# Patient Record
Sex: FEMALE | Race: WHITE | NOT HISPANIC OR LATINO | Employment: OTHER | URBAN - METROPOLITAN AREA
[De-identification: names, ages, dates, MRNs, and addresses within clinical notes are randomized per-mention and may not be internally consistent; named-entity substitution may affect disease eponyms.]

---

## 2024-07-29 ENCOUNTER — OFFICE VISIT (OUTPATIENT)
Dept: MEDICAL GROUP | Facility: MEDICAL CENTER | Age: 79
End: 2024-07-29
Payer: MEDICARE

## 2024-07-29 VITALS
HEART RATE: 50 BPM | HEIGHT: 64 IN | BODY MASS INDEX: 28.51 KG/M2 | TEMPERATURE: 97.3 F | SYSTOLIC BLOOD PRESSURE: 134 MMHG | DIASTOLIC BLOOD PRESSURE: 80 MMHG | WEIGHT: 167 LBS | OXYGEN SATURATION: 93 %

## 2024-07-29 DIAGNOSIS — R73.09 ELEVATED GLUCOSE LEVEL: ICD-10-CM

## 2024-07-29 DIAGNOSIS — Z00.00 ENCOUNTER FOR PREVENTATIVE ADULT HEALTH CARE EXAMINATION: Primary | ICD-10-CM

## 2024-07-29 DIAGNOSIS — I10 PRIMARY HYPERTENSION: ICD-10-CM

## 2024-07-29 DIAGNOSIS — G04.90 ENCEPHALITIS: ICD-10-CM

## 2024-07-29 DIAGNOSIS — E04.1 THYROID NODULE: ICD-10-CM

## 2024-07-29 DIAGNOSIS — E78.5 HYPERLIPIDEMIA, UNSPECIFIED HYPERLIPIDEMIA TYPE: ICD-10-CM

## 2024-07-29 DIAGNOSIS — R56.9 SEIZURE (HCC): ICD-10-CM

## 2024-07-29 DIAGNOSIS — M35.3 POLYMYALGIA RHEUMATICA (HCC): ICD-10-CM

## 2024-07-29 DIAGNOSIS — J44.9 CHRONIC OBSTRUCTIVE PULMONARY DISEASE, UNSPECIFIED COPD TYPE (HCC): ICD-10-CM

## 2024-07-29 PROBLEM — M06.9 POLYARTHRITIS RHEUMATICA (HCC): Status: ACTIVE | Noted: 2024-07-29

## 2024-07-29 RX ORDER — PREDNISONE 2.5 MG/1
TABLET ORAL
COMMUNITY
Start: 2024-07-09

## 2024-07-29 RX ORDER — EZETIMIBE 10 MG/1
10 TABLET ORAL
COMMUNITY
Start: 2024-07-09

## 2024-07-29 RX ORDER — LEVETIRACETAM 500 MG/1
500 TABLET ORAL 2 TIMES DAILY
COMMUNITY
Start: 2024-07-09

## 2024-07-29 RX ORDER — FLUTICASONE FUROATE AND VILANTEROL TRIFENATATE 100; 25 UG/1; UG/1
POWDER RESPIRATORY (INHALATION)
COMMUNITY
Start: 2024-07-09

## 2024-07-29 RX ORDER — CARVEDILOL 25 MG/1
TABLET ORAL
COMMUNITY
Start: 2024-07-09

## 2024-07-29 RX ORDER — HYDROCHLOROTHIAZIDE 25 MG/1
TABLET ORAL
COMMUNITY
Start: 2024-07-09

## 2024-07-29 RX ORDER — LOSARTAN POTASSIUM 100 MG/1
100 TABLET ORAL
COMMUNITY
Start: 2024-07-09

## 2024-07-29 ASSESSMENT — PATIENT HEALTH QUESTIONNAIRE - PHQ9: CLINICAL INTERPRETATION OF PHQ2 SCORE: 0

## 2024-07-30 ENCOUNTER — HOSPITAL ENCOUNTER (OUTPATIENT)
Dept: LAB | Facility: MEDICAL CENTER | Age: 79
End: 2024-07-30
Attending: FAMILY MEDICINE
Payer: MEDICARE

## 2024-07-30 DIAGNOSIS — E04.1 THYROID NODULE: ICD-10-CM

## 2024-07-30 DIAGNOSIS — E78.5 HYPERLIPIDEMIA, UNSPECIFIED HYPERLIPIDEMIA TYPE: ICD-10-CM

## 2024-07-30 DIAGNOSIS — R73.09 ELEVATED GLUCOSE LEVEL: ICD-10-CM

## 2024-07-30 LAB
ALBUMIN SERPL BCP-MCNC: 4 G/DL (ref 3.2–4.9)
ALBUMIN/GLOB SERPL: 1.5 G/DL
ALP SERPL-CCNC: 66 U/L (ref 30–99)
ALT SERPL-CCNC: 35 U/L (ref 2–50)
ANION GAP SERPL CALC-SCNC: 15 MMOL/L (ref 7–16)
AST SERPL-CCNC: 36 U/L (ref 12–45)
BASOPHILS # BLD AUTO: 1 % (ref 0–1.8)
BASOPHILS # BLD: 0.08 K/UL (ref 0–0.12)
BILIRUB SERPL-MCNC: 0.7 MG/DL (ref 0.1–1.5)
BUN SERPL-MCNC: 21 MG/DL (ref 8–22)
CALCIUM ALBUM COR SERPL-MCNC: 11 MG/DL (ref 8.5–10.5)
CALCIUM SERPL-MCNC: 11 MG/DL (ref 8.5–10.5)
CHLORIDE SERPL-SCNC: 92 MMOL/L (ref 96–112)
CHOLEST SERPL-MCNC: 215 MG/DL (ref 100–199)
CO2 SERPL-SCNC: 24 MMOL/L (ref 20–33)
CREAT SERPL-MCNC: 0.92 MG/DL (ref 0.5–1.4)
EOSINOPHIL # BLD AUTO: 0.21 K/UL (ref 0–0.51)
EOSINOPHIL NFR BLD: 2.7 % (ref 0–6.9)
ERYTHROCYTE [DISTWIDTH] IN BLOOD BY AUTOMATED COUNT: 41 FL (ref 35.9–50)
EST. AVERAGE GLUCOSE BLD GHB EST-MCNC: 120 MG/DL
GFR SERPLBLD CREATININE-BSD FMLA CKD-EPI: 63 ML/MIN/1.73 M 2
GLOBULIN SER CALC-MCNC: 2.6 G/DL (ref 1.9–3.5)
GLUCOSE SERPL-MCNC: 120 MG/DL (ref 65–99)
HBA1C MFR BLD: 5.8 % (ref 4–5.6)
HCT VFR BLD AUTO: 43.5 % (ref 37–47)
HDLC SERPL-MCNC: 41 MG/DL
HGB BLD-MCNC: 15 G/DL (ref 12–16)
IMM GRANULOCYTES # BLD AUTO: 0.03 K/UL (ref 0–0.11)
IMM GRANULOCYTES NFR BLD AUTO: 0.4 % (ref 0–0.9)
LDLC SERPL CALC-MCNC: 124 MG/DL
LYMPHOCYTES # BLD AUTO: 1.41 K/UL (ref 1–4.8)
LYMPHOCYTES NFR BLD: 18.1 % (ref 22–41)
MCH RBC QN AUTO: 33.6 PG (ref 27–33)
MCHC RBC AUTO-ENTMCNC: 34.5 G/DL (ref 32.2–35.5)
MCV RBC AUTO: 97.3 FL (ref 81.4–97.8)
MONOCYTES # BLD AUTO: 0.71 K/UL (ref 0–0.85)
MONOCYTES NFR BLD AUTO: 9.1 % (ref 0–13.4)
NEUTROPHILS # BLD AUTO: 5.33 K/UL (ref 1.82–7.42)
NEUTROPHILS NFR BLD: 68.7 % (ref 44–72)
NRBC # BLD AUTO: 0 K/UL
NRBC BLD-RTO: 0 /100 WBC (ref 0–0.2)
PLATELET # BLD AUTO: 339 K/UL (ref 164–446)
PMV BLD AUTO: 10.4 FL (ref 9–12.9)
POTASSIUM SERPL-SCNC: 4.4 MMOL/L (ref 3.6–5.5)
PROT SERPL-MCNC: 6.6 G/DL (ref 6–8.2)
RBC # BLD AUTO: 4.47 M/UL (ref 4.2–5.4)
SODIUM SERPL-SCNC: 131 MMOL/L (ref 135–145)
T4 FREE SERPL-MCNC: 1.22 NG/DL (ref 0.93–1.7)
TRIGL SERPL-MCNC: 250 MG/DL (ref 0–149)
TSH SERPL-ACNC: 0.68 UIU/ML (ref 0.35–5.5)
WBC # BLD AUTO: 7.8 K/UL (ref 4.8–10.8)

## 2024-07-30 PROCEDURE — 85025 COMPLETE CBC W/AUTO DIFF WBC: CPT

## 2024-07-30 PROCEDURE — 84439 ASSAY OF FREE THYROXINE: CPT

## 2024-07-30 PROCEDURE — 80053 COMPREHEN METABOLIC PANEL: CPT

## 2024-07-30 PROCEDURE — 83036 HEMOGLOBIN GLYCOSYLATED A1C: CPT | Mod: GA

## 2024-07-30 PROCEDURE — 84443 ASSAY THYROID STIM HORMONE: CPT

## 2024-07-30 PROCEDURE — 36415 COLL VENOUS BLD VENIPUNCTURE: CPT

## 2024-07-30 PROCEDURE — 80061 LIPID PANEL: CPT

## 2024-07-31 ENCOUNTER — PATIENT MESSAGE (OUTPATIENT)
Dept: MEDICAL GROUP | Facility: MEDICAL CENTER | Age: 79
End: 2024-07-31
Payer: MEDICARE

## 2024-08-06 ENCOUNTER — PATIENT MESSAGE (OUTPATIENT)
Dept: MEDICAL GROUP | Facility: MEDICAL CENTER | Age: 79
End: 2024-08-06
Payer: MEDICARE

## 2024-08-06 DIAGNOSIS — I10 PRIMARY HYPERTENSION: ICD-10-CM

## 2024-08-06 DIAGNOSIS — J44.9 CHRONIC OBSTRUCTIVE PULMONARY DISEASE, UNSPECIFIED COPD TYPE (HCC): ICD-10-CM

## 2024-08-06 DIAGNOSIS — M06.9 RHEUMATOID ARTHRITIS INVOLVING MULTIPLE SITES, UNSPECIFIED WHETHER RHEUMATOID FACTOR PRESENT (HCC): ICD-10-CM

## 2024-08-06 DIAGNOSIS — G04.90 ENCEPHALITIS: ICD-10-CM

## 2024-08-06 DIAGNOSIS — E78.5 HYPERLIPIDEMIA, UNSPECIFIED HYPERLIPIDEMIA TYPE: ICD-10-CM

## 2024-08-13 ENCOUNTER — TELEPHONE (OUTPATIENT)
Dept: HEALTH INFORMATION MANAGEMENT | Facility: OTHER | Age: 79
End: 2024-08-13
Payer: MEDICARE

## 2024-08-15 RX ORDER — HYDROCHLOROTHIAZIDE 25 MG/1
25 TABLET ORAL DAILY
Qty: 90 TABLET | Refills: 3 | Status: SHIPPED | OUTPATIENT
Start: 2024-08-15

## 2024-08-15 RX ORDER — EZETIMIBE 10 MG/1
10 TABLET ORAL
Qty: 90 TABLET | Refills: 3 | Status: SHIPPED | OUTPATIENT
Start: 2024-08-15

## 2024-08-15 RX ORDER — PREDNISONE 2.5 MG/1
TABLET ORAL
Qty: 90 TABLET | Refills: 3 | Status: SHIPPED | OUTPATIENT
Start: 2024-08-15

## 2024-08-15 RX ORDER — FLUTICASONE FUROATE AND VILANTEROL TRIFENATATE 100; 25 UG/1; UG/1
1 POWDER RESPIRATORY (INHALATION) DAILY
Qty: 60 EACH | Refills: 11 | Status: SHIPPED | OUTPATIENT
Start: 2024-08-15

## 2024-08-15 RX ORDER — LOSARTAN POTASSIUM 100 MG/1
100 TABLET ORAL
Qty: 90 TABLET | Refills: 3 | Status: SHIPPED | OUTPATIENT
Start: 2024-08-15

## 2024-08-15 RX ORDER — LEVETIRACETAM 500 MG/1
500 TABLET ORAL 2 TIMES DAILY
Qty: 180 TABLET | Refills: 3 | Status: SHIPPED | OUTPATIENT
Start: 2024-08-15

## 2024-08-15 RX ORDER — CARVEDILOL 25 MG/1
25 TABLET ORAL 2 TIMES DAILY
Qty: 180 TABLET | Refills: 3 | Status: SHIPPED | OUTPATIENT
Start: 2024-08-15

## 2024-10-23 ENCOUNTER — APPOINTMENT (OUTPATIENT)
Dept: NEUROLOGY | Facility: MEDICAL CENTER | Age: 79
End: 2024-10-23
Attending: PSYCHIATRY & NEUROLOGY
Payer: MEDICARE

## 2024-10-25 ENCOUNTER — TELEPHONE (OUTPATIENT)
Dept: HEALTH INFORMATION MANAGEMENT | Facility: OTHER | Age: 79
End: 2024-10-25
Payer: MEDICARE

## 2024-10-29 ENCOUNTER — APPOINTMENT (OUTPATIENT)
Dept: MEDICAL GROUP | Facility: MEDICAL CENTER | Age: 79
End: 2024-10-29
Payer: MEDICARE

## 2024-11-08 ENCOUNTER — OFFICE VISIT (OUTPATIENT)
Dept: MEDICAL GROUP | Facility: MEDICAL CENTER | Age: 79
End: 2024-11-08

## 2024-11-08 ENCOUNTER — HOSPITAL ENCOUNTER (OUTPATIENT)
Facility: MEDICAL CENTER | Age: 79
End: 2024-11-08
Attending: FAMILY MEDICINE

## 2024-11-08 VITALS
BODY MASS INDEX: 28.51 KG/M2 | SYSTOLIC BLOOD PRESSURE: 132 MMHG | OXYGEN SATURATION: 95 % | HEART RATE: 50 BPM | DIASTOLIC BLOOD PRESSURE: 74 MMHG | TEMPERATURE: 97.3 F | HEIGHT: 64 IN | WEIGHT: 167 LBS

## 2024-11-08 DIAGNOSIS — M25.50 ARTHRALGIA, UNSPECIFIED JOINT: ICD-10-CM

## 2024-11-08 DIAGNOSIS — R22.42 LOCALIZED SWELLING OF LEFT LOWER EXTREMITY: ICD-10-CM

## 2024-11-08 DIAGNOSIS — I10 PRIMARY HYPERTENSION: ICD-10-CM

## 2024-11-08 DIAGNOSIS — R73.09 ELEVATED GLUCOSE LEVEL: ICD-10-CM

## 2024-11-08 DIAGNOSIS — E87.1 HYPONATREMIA: ICD-10-CM

## 2024-11-08 DIAGNOSIS — M06.9 RHEUMATOID ARTHRITIS INVOLVING MULTIPLE SITES, UNSPECIFIED WHETHER RHEUMATOID FACTOR PRESENT (HCC): ICD-10-CM

## 2024-11-08 DIAGNOSIS — E78.5 HYPERLIPIDEMIA, UNSPECIFIED HYPERLIPIDEMIA TYPE: ICD-10-CM

## 2024-11-08 LAB — SODIUM UR-SCNC: 82 MMOL/L

## 2024-11-08 PROCEDURE — 3078F DIAST BP <80 MM HG: CPT | Performed by: FAMILY MEDICINE

## 2024-11-08 PROCEDURE — 84300 ASSAY OF URINE SODIUM: CPT

## 2024-11-08 PROCEDURE — 99214 OFFICE O/P EST MOD 30 MIN: CPT | Performed by: FAMILY MEDICINE

## 2024-11-08 PROCEDURE — 3075F SYST BP GE 130 - 139MM HG: CPT | Performed by: FAMILY MEDICINE

## 2024-11-08 RX ORDER — MELOXICAM 15 MG/1
15 TABLET ORAL DAILY
Qty: 90 TABLET | Refills: 3 | Status: SHIPPED | OUTPATIENT
Start: 2024-11-08

## 2024-11-08 ASSESSMENT — FIBROSIS 4 INDEX: FIB4 SCORE: 1.42

## 2024-11-08 NOTE — PROGRESS NOTES
Verbal consent was acquired by the patient to use [employer] ambient listening note generation during this visit:  Yes      Chief complaint::Diagnoses of Arthralgia, unspecified joint, Rheumatoid arthritis involving multiple sites, unspecified whether rheumatoid factor present (HCC), Hyponatremia, Localized swelling of left lower extremity, Primary hypertension, Hyperlipidemia, unspecified hyperlipidemia type, and Elevated glucose level were pertinent to this visit.    Assessment and Plan:   The following treatment plan was discussed:     Assessment & Plan  1. Hyponatremia.  This is a new diagnosis. She is currently taking hydrochlorothiazide and has been watching her sodium intake due to congestive heart failure. She reports feeling extremely tired. A random urine collection has been ordered to test her sodium levels. It is important to determine if her low sodium is due to poor intake, dilution, or sodium wasting. She is advised to be careful with her sodium intake to avoid worsening her heart failure.     2. Elevated glucose intolerance.  Chronic, stable.  Her A1c is at 5.8%. She is advised to reduce the amount of sweets and carbohydrates in her diet to help lower her sugar levels. She should also try to go for walks, especially after meals.    3. Mixed hyperlipidemia.  This is a chronic and stable condition. She is advised to continue dietary changes and to continue taking Zetia 10 mg daily. A fasting cholesterol test will be done next time to better assess her triglycerides and LDL levels.    4. Elevated calcium levels.  New to the patient.  She is advised to be cautious with calcium supplementation. She currently takes one 600 mg calcium with vitamin D supplement in the morning and has been instructed to continue this regimen. Her calcium levels will be monitored.    5. Left lower extremity swelling.  This is an acute condition with increasing tenderness in the posterior calf, mild pitting edema, and no  significant color change. A venous ultrasound has been recommended to rule out a possible blood clot.    6. Arthralgias.  This is a chronic and stable condition. She has a history of rheumatoid arthritis, which may be contributing to her discomfort. Due to her high intake of Aleve and Advil, there is concern about kidney dysfunction. Meloxicam 15 mg has been prescribed to take once a day. She is advised to avoid other NSAIDs while on this medication but can take Tylenol if needed.    7. Primary hypertension.  This is a chronic and stable condition. Electrolytes are slightly off, but potassium levels are normal. She is to continue taking levothyroxine 25 mg daily, carvedilol 25 mg twice a day, and losartan 100 mg at bedtime. Blood pressure will continue to be monitored.    8. Postnasal drip.  Acute.  She has been diagnosed with postnasal drip and is currently using Flonase nasal spray once a day in the morning. She is advised to add Claritin to her regimen to help with symptoms.    9. Medication Management.  She has been taking Keppra 500 mg in the morning and 500 mg at night but stopped the morning dose due to fatigue. She is advised to resume the morning dose to prevent seizures.    Anaid was seen today for follow-up and lab results.    Diagnoses and all orders for this visit:    Arthralgia, unspecified joint  -     meloxicam (MOBIC) 15 MG tablet; Take 1 Tablet by mouth every day.    Rheumatoid arthritis involving multiple sites, unspecified whether rheumatoid factor present (HCC)  -     meloxicam (MOBIC) 15 MG tablet; Take 1 Tablet by mouth every day.    Hyponatremia  -     URINE SODIUM RANDOM; Future    Localized swelling of left lower extremity  -     US-EXTREMITY VENOUS LOWER UNILAT LEFT; Future    Primary hypertension    Hyperlipidemia, unspecified hyperlipidemia type    Elevated glucose level        Followup: Return if symptoms worsen or fail to improve.    Subjective/HPI:   HPI:    Anaid Aponte is a  bal 79 y.o. female here for   Chief Complaint   Patient presents with    Follow-Up    Lab Results        History of Present Illness  The patient is a 79-year-old female who is here to review her labs. She is accompanied by an adult female.    She reports constant fatigue and has been taking vitamin B12. She has a history of thyroid nodules and has been avoiding salt due to a previous diagnosis of heart failure.     She mentions leg swelling that started yesterday, with one leg more swollen than the other. Her legs have always been tender to touch. She had a fall previously and underwent ultrasounds 3 to 4 months apart, which showed no blood clot. She is cautious about falling.    She has been taking Keppra 500 mg twice daily but stopped the morning dose due to perceived fatigue.    She sleeps well and uses Flonase nasal spray once daily in the morning for postnasal drip. She also takes Delsym PM at night for coughing. She was previously on Claritin but discontinued it.    She has a history of rheumatoid arthritis and has been taking Advil for pain relief, with a dosage of 3 in the morning, 2 in the afternoon, and 2 at night. She has a history of a slipped disc and believes she has arthritis.    She has reduced her calcium intake from 600 mg twice daily to once daily, taken in the morning with vitamin D.    IMMUNIZATIONS  She did have the influenza vaccine.    Current Medicines (including changes today)  Current Outpatient Medications   Medication Sig Dispense Refill    meloxicam (MOBIC) 15 MG tablet Take 1 Tablet by mouth every day. 90 Tablet 3    carvedilol (COREG) 25 MG Tab Take 1 Tablet by mouth 2 times a day. 180 Tablet 3    ezetimibe (ZETIA) 10 MG Tab Take 1 Tablet by mouth every day. 90 Tablet 3    BREO ELLIPTA 100-25 MCG/ACT AEROSOL POWDER, BREATH ACTIVATED Inhale 1 Puff every day. INHALE ONE PUFF into THE lungs daily 60 Each 11    hydroCHLOROthiazide 25 MG Tab Take 1 Tablet by mouth every day. Take 1  "tablet (25 mg total) by mouth daily. 90 Tablet 3    levETIRAcetam (KEPPRA) 500 MG Tab Take 1 Tablet by mouth 2 times a day. 180 Tablet 3    losartan (COZAAR) 100 MG Tab Take 1 Tablet by mouth at bedtime. 90 Tablet 3    prednisONE (DELTASONE) 2.5 MG Tab Take ONE tablet (2.5 MG total) by MOUTH daily. Take with food 90 Tablet 3     No current facility-administered medications for this visit.     Past Medical/ Surgical History  She  has a past medical history of COPD (chronic obstructive pulmonary disease) (HCC) (2024), Diverticulitis, Encephalitis (2024), Hyperlipidemia (2024), Polyarthritis rheumatica (HCC) (2024), Primary hypertension (2024), and Thyroid nodule.  She  has a past surgical history that includes other abdominal surgery and knee replacement, total.       Objective:   /74   Pulse (!) 50   Temp 36.3 °C (97.3 °F)   Ht 1.626 m (5' 4\")   Wt 75.8 kg (167 lb)   SpO2 95%  Body mass index is 28.67 kg/m².    Physical Exam  Constitutional:       General: She is not in acute distress.     Appearance: She is not ill-appearing or toxic-appearing.   HENT:      Head: Normocephalic and atraumatic.      Right Ear: External ear normal.      Left Ear: External ear normal.      Nose: Nose normal. No rhinorrhea.      Mouth/Throat:      Mouth: Mucous membranes are moist.   Eyes:      General:         Right eye: No discharge.         Left eye: No discharge.      Conjunctiva/sclera: Conjunctivae normal.      Pupils: Pupils are equal, round, and reactive to light.   Pulmonary:      Effort: Pulmonary effort is normal. No respiratory distress.   Abdominal:      General: Abdomen is flat. There is no distension.   Musculoskeletal:         General: No swelling or tenderness.      Cervical back: Normal range of motion and neck supple.      Right lower le+ Edema present.      Left lower le+ Edema present.      Comments: Tenderness to palpation to posterior leg along the calf   Skin:     " General: Skin is warm and dry.      Findings: No rash.   Neurological:      General: No focal deficit present.      Mental Status: She is alert and oriented to person, place, and time. Mental status is at baseline.      Gait: Gait is intact.   Psychiatric:         Mood and Affect: Mood and affect normal.          Lab/ Imaging Results:  Results  Laboratory Studies  White blood cells, red blood cells, hemoglobin, hematocrit are normal. Red blood cells are slightly saturated. TSH is normal. A1c is 5.8. Triglycerides are high. LDL cholesterol is 10. Sodium levels are low. Chloride levels are low. Blood sugar is 102. BUN and kidney function are normal. Calcium levels are high. Liver enzymes and liver function are normal.    Please note that this dictation was created using voice recognition software. I have made every reasonable attempt to correct obvious errors, but I expect that there are errors of grammar and possibly content that I did not discover before finalizing the note.

## 2025-01-03 ENCOUNTER — TELEPHONE (OUTPATIENT)
Dept: HEALTH INFORMATION MANAGEMENT | Facility: OTHER | Age: 80
End: 2025-01-03

## 2025-02-03 ENCOUNTER — APPOINTMENT (OUTPATIENT)
Dept: URBAN - METROPOLITAN AREA CLINIC 22 | Facility: CLINIC | Age: 80
Setting detail: DERMATOLOGY
End: 2025-02-03

## 2025-02-03 DIAGNOSIS — L82.0 INFLAMED SEBORRHEIC KERATOSIS: ICD-10-CM

## 2025-02-03 DIAGNOSIS — Z80.8 FAMILY HISTORY OF MALIGNANT NEOPLASM OF OTHER ORGANS OR SYSTEMS: ICD-10-CM

## 2025-02-03 DIAGNOSIS — D18.0 HEMANGIOMA: ICD-10-CM

## 2025-02-03 DIAGNOSIS — Z71.89 OTHER SPECIFIED COUNSELING: ICD-10-CM

## 2025-02-03 DIAGNOSIS — L82.1 OTHER SEBORRHEIC KERATOSIS: ICD-10-CM

## 2025-02-03 DIAGNOSIS — L57.0 ACTINIC KERATOSIS: ICD-10-CM

## 2025-02-03 DIAGNOSIS — L81.4 OTHER MELANIN HYPERPIGMENTATION: ICD-10-CM

## 2025-02-03 DIAGNOSIS — D22 MELANOCYTIC NEVI: ICD-10-CM

## 2025-02-03 PROBLEM — D18.01 HEMANGIOMA OF SKIN AND SUBCUTANEOUS TISSUE: Status: ACTIVE | Noted: 2025-02-03

## 2025-02-03 PROBLEM — D48.5 NEOPLASM OF UNCERTAIN BEHAVIOR OF SKIN: Status: ACTIVE | Noted: 2025-02-03

## 2025-02-03 PROBLEM — D22.5 MELANOCYTIC NEVI OF TRUNK: Status: ACTIVE | Noted: 2025-02-03

## 2025-02-03 PROCEDURE — ? SUNSCREEN RECOMMENDATIONS

## 2025-02-03 PROCEDURE — ? COUNSELING

## 2025-02-03 PROCEDURE — 11102 TANGNTL BX SKIN SINGLE LES: CPT

## 2025-02-03 PROCEDURE — ? BIOPSY BY SHAVE METHOD

## 2025-02-03 PROCEDURE — 99203 OFFICE O/P NEW LOW 30 MIN: CPT | Mod: 25

## 2025-02-03 PROCEDURE — 17003 DESTRUCT PREMALG LES 2-14: CPT

## 2025-02-03 PROCEDURE — ? ADDITIONAL NOTES

## 2025-02-03 PROCEDURE — 17000 DESTRUCT PREMALG LESION: CPT | Mod: 59

## 2025-02-03 PROCEDURE — ? LIQUID NITROGEN

## 2025-02-03 ASSESSMENT — LOCATION ZONE DERM
LOCATION ZONE: LEG
LOCATION ZONE: TRUNK
LOCATION ZONE: FACE
LOCATION ZONE: NECK
LOCATION ZONE: ARM

## 2025-02-03 ASSESSMENT — LOCATION DETAILED DESCRIPTION DERM
LOCATION DETAILED: LEFT ANTERIOR PROXIMAL UPPER ARM
LOCATION DETAILED: RIGHT LATERAL FOREHEAD
LOCATION DETAILED: RIGHT MID-UPPER BACK
LOCATION DETAILED: LEFT SUPERIOR MEDIAL UPPER BACK
LOCATION DETAILED: LEFT DISTAL LATERAL PRETIBIAL REGION
LOCATION DETAILED: LEFT MID-UPPER BACK
LOCATION DETAILED: RIGHT VENTRAL PROXIMAL FOREARM
LOCATION DETAILED: RIGHT MEDIAL SUPERIOR CHEST
LOCATION DETAILED: RIGHT ANTERIOR DISTAL UPPER ARM
LOCATION DETAILED: RIGHT CLAVICULAR NECK
LOCATION DETAILED: LEFT INFERIOR UPPER BACK
LOCATION DETAILED: LEFT CLAVICULAR SKIN
LOCATION DETAILED: RIGHT INFERIOR UPPER BACK
LOCATION DETAILED: EPIGASTRIC SKIN
LOCATION DETAILED: RIGHT SUPERIOR MEDIAL MIDBACK

## 2025-02-03 ASSESSMENT — LOCATION SIMPLE DESCRIPTION DERM
LOCATION SIMPLE: LEFT CLAVICULAR SKIN
LOCATION SIMPLE: RIGHT UPPER BACK
LOCATION SIMPLE: LEFT LOWER LEG
LOCATION SIMPLE: RIGHT ANTERIOR NECK
LOCATION SIMPLE: CHEST
LOCATION SIMPLE: LEFT UPPER BACK
LOCATION SIMPLE: LEFT UPPER ARM
LOCATION SIMPLE: RIGHT UPPER ARM
LOCATION SIMPLE: RIGHT FOREHEAD
LOCATION SIMPLE: ABDOMEN
LOCATION SIMPLE: RIGHT FOREARM
LOCATION SIMPLE: RIGHT LOWER BACK

## 2025-02-03 NOTE — PROCEDURE: LIQUID NITROGEN
Consent: The patient's consent was obtained including but not limited to risks of crusting, scabbing, blistering, scarring, darker or lighter pigmentary change, recurrence, incomplete removal and infection.
Show Aperture Variable?: Yes
Duration Of Freeze Thaw-Cycle (Seconds): 3
Detail Level: Detailed
Render Post-Care Instructions In Note?: no
Post-Care Instructions: I reviewed with the patient in detail post-care instructions. Patient is to wear sunprotection, and avoid picking at any of the treated lesions. Pt may apply Vaseline to crusted or scabbing areas.
Number Of Freeze-Thaw Cycles: 2 freeze-thaw cycles

## 2025-02-13 ENCOUNTER — APPOINTMENT (OUTPATIENT)
Dept: MEDICAL GROUP | Facility: MEDICAL CENTER | Age: 80
End: 2025-02-13
Payer: MEDICARE

## 2025-02-25 ENCOUNTER — HOSPITAL ENCOUNTER (EMERGENCY)
Facility: MEDICAL CENTER | Age: 80
End: 2025-02-25
Attending: EMERGENCY MEDICINE
Payer: MEDICARE

## 2025-02-25 ENCOUNTER — APPOINTMENT (OUTPATIENT)
Dept: MEDICAL GROUP | Facility: MEDICAL CENTER | Age: 80
End: 2025-02-25
Payer: MEDICARE

## 2025-02-25 VITALS
HEIGHT: 64 IN | TEMPERATURE: 98.3 F | SYSTOLIC BLOOD PRESSURE: 209 MMHG | OXYGEN SATURATION: 96 % | WEIGHT: 165 LBS | DIASTOLIC BLOOD PRESSURE: 86 MMHG | BODY MASS INDEX: 28.17 KG/M2 | HEART RATE: 52 BPM | RESPIRATION RATE: 19 BRPM

## 2025-02-25 DIAGNOSIS — M54.50 CHRONIC LEFT-SIDED LOW BACK PAIN WITHOUT SCIATICA: ICD-10-CM

## 2025-02-25 DIAGNOSIS — M35.3 POLYMYALGIA RHEUMATICA (HCC): ICD-10-CM

## 2025-02-25 DIAGNOSIS — G89.29 CHRONIC LEFT-SIDED LOW BACK PAIN WITHOUT SCIATICA: ICD-10-CM

## 2025-02-25 PROCEDURE — 700111 HCHG RX REV CODE 636 W/ 250 OVERRIDE (IP): Performed by: EMERGENCY MEDICINE

## 2025-02-25 PROCEDURE — 96372 THER/PROPH/DIAG INJ SC/IM: CPT

## 2025-02-25 PROCEDURE — 700102 HCHG RX REV CODE 250 W/ 637 OVERRIDE(OP): Performed by: EMERGENCY MEDICINE

## 2025-02-25 PROCEDURE — A9270 NON-COVERED ITEM OR SERVICE: HCPCS | Performed by: EMERGENCY MEDICINE

## 2025-02-25 PROCEDURE — 99284 EMERGENCY DEPT VISIT MOD MDM: CPT

## 2025-02-25 RX ORDER — METHYLPREDNISOLONE 4 MG/1
8 TABLET ORAL ONCE
Status: DISCONTINUED | OUTPATIENT
Start: 2025-02-25 | End: 2025-02-25 | Stop reason: HOSPADM

## 2025-02-25 RX ORDER — METHYLPREDNISOLONE 4 MG/1
TABLET ORAL
Qty: 1 EACH | Refills: 0 | Status: SHIPPED | OUTPATIENT
Start: 2025-02-25 | End: 2025-03-18

## 2025-02-25 RX ORDER — PREDNISONE 20 MG/1
20 TABLET ORAL ONCE
Status: DISCONTINUED | OUTPATIENT
Start: 2025-02-25 | End: 2025-02-25

## 2025-02-25 RX ORDER — KETOROLAC TROMETHAMINE 15 MG/ML
15 INJECTION, SOLUTION INTRAMUSCULAR; INTRAVENOUS ONCE
Status: COMPLETED | OUTPATIENT
Start: 2025-02-25 | End: 2025-02-25

## 2025-02-25 RX ORDER — CYCLOBENZAPRINE HCL 10 MG
5 TABLET ORAL ONCE
Status: COMPLETED | OUTPATIENT
Start: 2025-02-25 | End: 2025-02-25

## 2025-02-25 RX ORDER — ACETAMINOPHEN 325 MG/1
650 TABLET ORAL ONCE
Status: COMPLETED | OUTPATIENT
Start: 2025-02-25 | End: 2025-02-25

## 2025-02-25 RX ORDER — ORPHENADRINE CITRATE 100 MG/1
100 TABLET ORAL 2 TIMES DAILY
Qty: 20 TABLET | Refills: 0 | Status: SHIPPED | OUTPATIENT
Start: 2025-02-25

## 2025-02-25 RX ADMIN — ACETAMINOPHEN 650 MG: 325 TABLET ORAL at 17:19

## 2025-02-25 RX ADMIN — CYCLOBENZAPRINE 5 MG: 10 TABLET, FILM COATED ORAL at 17:19

## 2025-02-25 RX ADMIN — KETOROLAC TROMETHAMINE 15 MG: 15 INJECTION, SOLUTION INTRAMUSCULAR; INTRAVENOUS at 17:21

## 2025-02-25 ASSESSMENT — PAIN DESCRIPTION - PAIN TYPE
TYPE: ACUTE PAIN
TYPE: ACUTE PAIN

## 2025-02-25 ASSESSMENT — FIBROSIS 4 INDEX: FIB4 SCORE: 1.42

## 2025-02-25 NOTE — ED NOTES
Attempted to pull patient from the lobby. Unable to locate the patient;  not in secondary waiting locations; not in restroom; not outside of Emergency Department entrance; not in designated smoking areas. Will attempt to locate patient again in 10 minutes.

## 2025-02-25 NOTE — ED TRIAGE NOTES
Chief Complaint   Patient presents with    Low Back Pain     Low back pain, worse on left side. Denies numbness and tingling in legs. Denies trauma. States getting into a car might have caused back pain to worsen.      Pt informed of wait times. Educated on triage process. Asked to return to triage RN for any new or worsening of symptoms. Thanked for patience.     In Family Room.

## 2025-02-26 NOTE — ED NOTES
Pt understands DC instructions and follow-up, DC paperwork provided, pt wheeled to [facility] for DC

## 2025-02-26 NOTE — DISCHARGE INSTRUCTIONS
Please continue all of your home medications, and add in the muscle relaxant and Medrol pack we have prescribed.  Use the contact information provided to schedule a spine consultation.  Your referral will go to Dr. oFntana, though there are many other groups in the area such as [facility].  You can also experiment with a TENS unit, using electrical pulses to distract from pain and create muscle relaxation.  Heat and lidocaine patches can also give some additional relief.

## 2025-02-26 NOTE — ED PROVIDER NOTES
ER Provider Note    Scribed for Sacha Mcmanus M.d. by Ventura Murphy. 2/25/2025  4:45 PM    Primary Care Provider: AILEEN Cifuentes    CHIEF COMPLAINT  Chief Complaint   Patient presents with    Low Back Pain     Low back pain, worse on left side. Denies numbness and tingling in legs. Denies trauma. States getting into a car might have caused back pain to worsen.      EXTERNAL RECORDS REVIEWED  Outpatient Notes The patient established care with her PCP in [place] after moving here from [place] on 7/29/24.    HPI/ROS  LIMITATION TO HISTORY   Select: : None    OUTSIDE HISTORIAN(S):  Family member was present and provided helpful and collateral history.    Anaid Aponte is a 79 y.o. female with a history of chronic low back pain who presents to the ED complaining of acute on chronic left low back pain onset 4 days ago.  This is in the setting of years of similar symptoms, and a history of polymyalgia rheumatica.  She states this pain is exacerbated by movement, which causes her to have trouble sleeping. She notes she is a side sleeper. Patient has tried lidocaine patches for her pain at home which alleviated her pain, but she states they have not been working for the last 2 days. The patient reports recent diarrhea and constipation.  She has not had dysuria, she denies pain radiating to her groin or buttock, numbness in the cauda equina distribution, or bowel or bladder incontinence.  No history of cancer or fevers.  No red flags for back pain.  She reports a history of hypertension and hyperlipidemia.  She has recently been relocated by her family to [place].  She does not see pain management here but has seen pain management in the past, and is not established with a spine specialist, orthopedist or physical therapist.    PAST MEDICAL HISTORY  Past Medical History:   Diagnosis Date    COPD (chronic obstructive pulmonary disease) (HCC) 07/29/2024    COPD, flonase and doing breo  Recent PFT     Diverticulitis     Encephalitis 07/29/2024    Lead to grand mal, seen by neurology  Taking Keppra    Hyperlipidemia 07/29/2024    Taking: Zetia    Polyarthritis rheumatica (HCC) 07/29/2024    Taking: prednisone 2.5 mg was seen by rheumatology  Pain generalized, worse when off the steroids.  On Prednisone 2.5 mg for the last 8 months    Primary hypertension 07/29/2024    Taking: Carvedilol, HCTZ, losartan  History of ow HR  Was following [facility] cardiology    Thyroid nodule        SURGICAL HISTORY  Past Surgical History:   Procedure Laterality Date    KNEE REPLACEMENT, TOTAL      right    OTHER ABDOMINAL SURGERY      diverticulitis       FAMILY HISTORY  Family History   Problem Relation Age of Onset    No Known Problems Mother     Heart Disease Father     Hyperlipidemia Father     No Known Problems Brother        SOCIAL HISTORY   reports that she has quit smoking. Her smoking use included cigarettes. She started smoking about 45 years ago. She has a 22.8 pack-year smoking history. She has never used smokeless tobacco. She reports that she does not currently use alcohol. She reports that she does not currently use drugs.    CURRENT MEDICATIONS  Previous Medications    BREO ELLIPTA 100-25 MCG/ACT AEROSOL POWDER, BREATH ACTIVATED    Inhale 1 Puff every day. INHALE ONE PUFF into THE lungs daily    CARVEDILOL (COREG) 25 MG TAB    Take 1 Tablet by mouth 2 times a day.    EZETIMIBE (ZETIA) 10 MG TAB    Take 1 Tablet by mouth every day.    HYDROCHLOROTHIAZIDE 25 MG TAB    Take 1 Tablet by mouth every day. Take 1 tablet (25 mg total) by mouth daily.    LEVETIRACETAM (KEPPRA) 500 MG TAB    Take 1 Tablet by mouth 2 times a day.    LOSARTAN (COZAAR) 100 MG TAB    Take 1 Tablet by mouth at bedtime.    MELOXICAM (MOBIC) 15 MG TABLET    Take 1 Tablet by mouth every day.    PREDNISONE (DELTASONE) 2.5 MG TAB    Take ONE tablet (2.5 MG total) by MOUTH daily. Take with food       ALLERGIES  Patient has no known allergies.    PHYSICAL  "EXAM  VITAL SIGNS: BP (!) 154/115   Pulse (!) 57   Temp 36.6 °C (97.8 °F) (Temporal)   Resp 20   Ht 1.626 m (5' 4\")   Wt 74.8 kg (165 lb)   SpO2 94%   BMI 28.32 kg/m²   Pulse ox interpretation: I interpret this pulse ox as normal.  Constitutional: Alert in no apparent distress.  HENT: No signs of trauma, Bilateral external ears normal, Nose normal.   Eyes: Pupils are equal and reactive, Conjunctiva normal, Non-icteric.   Neck: Normal range of motion, Supple, No stridor.    Cardiovascular: Normal peripheral perfusion  Thorax & Lungs: Unlabored respirations, equal chest expansion, no accessory muscle use  Abdomen: Non-distended  Skin:  No erythema, No rash.   Back: Normal alignment and ROM, No midline tenderness. Left lower lumbar and SI paraspinal muscle tenderness.  Extremities: No gross deformity  Musculoskeletal: Good range of motion in all major joints.   Neurologic: Alert, Normal motor function, No focal deficits noted.   Psychiatric: Affect normal, Judgment normal, Mood normal.       COURSE & MEDICAL DECISION MAKING     INITIAL ASSESSMENT, COURSE AND PLAN  Care Narrative:     4:45 PM Patient presents to the ED with exacerbation of her chronic low back pain.  No red flags for back pain.  No midline symptoms, no midline findings on exam.  Patient evaluated at bedside and discussed plan of care, including treating her pain here in the ED and following up with a back specialist to manage her chronic pain. I discussed adjusting her sleeping posture to keep her spine aligned at night. Patient will be treated with Toradol 15 mg IM, Tylenol 650 mg PO, Flexeril 5 mg PO, and her first dose of a medrol dosepak I am prescribing her. She will also be prescribed Norflex. I discussed plan for discharge and follow up as outlined below. The patient is stable for discharge at this time and will return for any new or worsening symptoms. Patient verbalizes understanding and support with my plan for discharge.  Patient and " daughter both happy with the plan of care and plan to establish specialty follow-up, given the chronicity of this concern.    ADDITIONAL PROBLEM MANAGED  Chronic as well as acute back pain.    DISPOSITION AND DISCUSSIONS  I have discussed management of the patient with the following physicians and MADELEINE's:  None    Discussion of management with other QHP or appropriate source(s): None     Escalation of care considered, and ultimately not performed: diagnostic imaging.  Considered, but this is a chronic pain condition, no reason to suspect occult fracture or infectious or central causes of her chronic symptoms.    Barriers to care at this time, including but not limited to:  None .     Decision tools and prescription drugs considered including, but not limited to:  medrol dosepak, Norfelx .    The patient will return for new or worsening symptoms and is stable at the time of discharge.    DISPOSITION:  Patient will be discharged home in stable condition.    FOLLOW UP:  Corey Fontana M.D.  [address] [facility]  [place] [address]  [phone]    Schedule an appointment as soon as possible for a visit         OUTPATIENT MEDICATIONS:  Discharge Medication List as of 2/25/2025  5:30 PM        START taking these medications    Details   methylPREDNISolone (MEDROL DOSEPAK) 4 MG Tablet Therapy Pack Use as directed, Disp-1 Each, R-0, Normal      orphenadrine (NORFLEX) 100 MG tablet Take 1 Tablet by mouth 2 times a day., Disp-20 Tablet, R-0, Normal              FINAL DIAGNOSIS  1. Chronic left-sided low back pain without sciatica    2. Polymyalgia rheumatica (HCC)            Ventura BECKER (Chucky), am scribing for, and in the presence of, Sacha Mcmanus M.D..    Electronically signed by: Ventura Murphy (Chucky), 2/25/2025    Sacha BECKER M.D. personally performed the services described in this documentation, as scribed by Ventura Murphy in my presence, and it is both accurate and complete.

## 2025-03-12 DIAGNOSIS — J44.9 CHRONIC OBSTRUCTIVE PULMONARY DISEASE, UNSPECIFIED COPD TYPE (HCC): ICD-10-CM

## 2025-03-13 RX ORDER — FLUTICASONE FUROATE AND VILANTEROL TRIFENATATE 100; 25 UG/1; UG/1
1 POWDER RESPIRATORY (INHALATION) DAILY
Qty: 60 EACH | Refills: 2 | Status: SHIPPED | OUTPATIENT
Start: 2025-03-13

## 2025-03-15 ENCOUNTER — APPOINTMENT (OUTPATIENT)
Dept: RADIOLOGY | Facility: MEDICAL CENTER | Age: 80
End: 2025-03-15
Attending: STUDENT IN AN ORGANIZED HEALTH CARE EDUCATION/TRAINING PROGRAM
Payer: MEDICARE

## 2025-03-15 ENCOUNTER — HOSPITAL ENCOUNTER (EMERGENCY)
Facility: MEDICAL CENTER | Age: 80
End: 2025-03-15
Attending: STUDENT IN AN ORGANIZED HEALTH CARE EDUCATION/TRAINING PROGRAM
Payer: MEDICARE

## 2025-03-15 VITALS
DIASTOLIC BLOOD PRESSURE: 91 MMHG | TEMPERATURE: 96.6 F | WEIGHT: 170 LBS | OXYGEN SATURATION: 95 % | HEIGHT: 64 IN | HEART RATE: 57 BPM | SYSTOLIC BLOOD PRESSURE: 229 MMHG | BODY MASS INDEX: 29.02 KG/M2 | RESPIRATION RATE: 19 BRPM
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
## 2025-03-15 DIAGNOSIS — R03.0 ELEVATED BLOOD PRESSURE READING: ICD-10-CM

## 2025-03-15 DIAGNOSIS — M25.572 ACUTE LEFT ANKLE PAIN: ICD-10-CM

## 2025-03-15 DIAGNOSIS — S09.90XA CLOSED HEAD INJURY, INITIAL ENCOUNTER: Primary | ICD-10-CM

## 2025-03-15 PROCEDURE — 700102 HCHG RX REV CODE 250 W/ 637 OVERRIDE(OP): Performed by: STUDENT IN AN ORGANIZED HEALTH CARE EDUCATION/TRAINING PROGRAM

## 2025-03-15 PROCEDURE — A9270 NON-COVERED ITEM OR SERVICE: HCPCS | Performed by: STUDENT IN AN ORGANIZED HEALTH CARE EDUCATION/TRAINING PROGRAM

## 2025-03-15 PROCEDURE — 99284 EMERGENCY DEPT VISIT MOD MDM: CPT

## 2025-03-15 PROCEDURE — 70450 CT HEAD/BRAIN W/O DYE: CPT

## 2025-03-15 PROCEDURE — 72125 CT NECK SPINE W/O DYE: CPT

## 2025-03-15 PROCEDURE — 73610 X-RAY EXAM OF ANKLE: CPT | Mod: LT

## 2025-03-15 RX ORDER — ACETAMINOPHEN 500 MG
1000 TABLET ORAL ONCE
Status: COMPLETED | OUTPATIENT
Start: 2025-03-15 | End: 2025-03-15

## 2025-03-15 RX ADMIN — ACETAMINOPHEN 1000 MG: 500 TABLET ORAL at 19:17

## 2025-03-15 ASSESSMENT — FIBROSIS 4 INDEX: FIB4 SCORE: 1.42

## 2025-03-16 NOTE — ED NOTES
Pt is for discharge, home instructions advised, follow up instructions and health education imparted. Patient verbalized understanding.  Pt left ED awake and ambulatory on steady gait.  Assisted to ER exit by wheelchair.

## 2025-03-16 NOTE — DISCHARGE INSTRUCTIONS
You are seen for evaluation after you hit your head.  Thankfully there is no evidence of bleeding in your brain or skull fracture.  There is no evidence of neck fracture.  You do have some nodules on your thyroid but you tell me that you are already following up with a doctor for this.  Your ankle x-ray shows no evidence of fracture.  Your blood pressure was high here, continue to monitor at home and follow-up with your primary care doctor to see if you need to adjust your blood pressure medicines.  Return emergency room for any new or worsening symptoms.  If you continue to have headache you may take Tylenol for your pain.

## 2025-03-16 NOTE — ED NOTES
Bedside report received from off going RN/tech: Graciela NEFF, assumed care of patient.  POC discussed with patient. Call light within reach, all needs addressed at this time.       Fall risk interventions in place: Patient's personal possessions are with in their safe reach, Place fall risk sign on patient's door, and Keep floor surfaces clean and dry (all applicable per [facility] Fall risk assessment)   Continuous monitoring: Pulse Ox or Blood Pressure  IVF/IV medications: Not Applicable   Oxygen: Room Air  Bedside sitter: Not Applicable   Isolation: Not Applicable

## 2025-03-16 NOTE — ED TRIAGE NOTES
Chief Complaint   Patient presents with    T-5000 Head Injury     Pt tripped and fell while walking outside her garage and hit the L side of her head.      Pt BIB EMS from home for above complaint. Pt denies LOC, doesn't take any blood thinners. Bruising noted to L side of forehead, and L hand. CSM intact.

## 2025-03-16 NOTE — ED PROVIDER NOTES
ED Provider Note    CHIEF COMPLAINT  Chief Complaint   Patient presents with    T-5000 Head Injury     Pt tripped and fell while walking outside her garage and hit the L side of her head.        EXTERNAL RECORDS REVIEWED  Outpatient Notes patient was seen by orthospine February 27, 2025 for low back pain acute on chronic and had an MRI of her L-spine done yesterday.  She was referred to physical therapy    HPI/ROS  LIMITATION TO HISTORY   Select: : None  OUTSIDE HISTORIAN(S):  EMS who transported patient and provided history    Anaid Aponte is a 79 y.o. female who presents for evaluation after mechanical ground-level fall.  She is walking with new shoes that she does not typically wear.  She was going up some steps and her foot caught and she fell and hit her head.  Did not lose consciousness.  She takes a baby aspirin daily.  She has a hematoma to the left forehead.  She was not able to get up therefore EMS was called.  She did walk to the [facility].  She notes that she has some soreness in the left thigh.  She is also having some left ankle pain but is able to ambulate.  She has some bruising to the left palm.  She states that she was feeling well earlier today.  She has some chronic back pain and is following up with orthospine for this but denies any increase in her back pain.  Patient notes that she does take losartan daily and she already took it this morning.    PAST MEDICAL HISTORY   has a past medical history of COPD (chronic obstructive pulmonary disease) (HCC) (07/29/2024), Diverticulitis, Encephalitis (07/29/2024), Hyperlipidemia (07/29/2024), Polyarthritis rheumatica (HCC) (07/29/2024), Primary hypertension (07/29/2024), and Thyroid nodule.    SURGICAL HISTORY   has a past surgical history that includes other abdominal surgery and knee replacement, total.    FAMILY HISTORY  Family History   Problem Relation Age of Onset    No Known Problems Mother     Heart Disease Father     Hyperlipidemia Father     No  "Known Problems Brother        SOCIAL HISTORY  Social History     Tobacco Use    Smoking status: Former     Current packs/day: 0.50     Average packs/day: 0.5 packs/day for 45.6 years (22.8 ttl pk-yrs)     Types: Cigarettes     Start date: 7/29/1979    Smokeless tobacco: Never   Vaping Use    Vaping status: Never Used   Substance and Sexual Activity    Alcohol use: Not Currently    Drug use: Not Currently    Sexual activity: Not Currently       CURRENT MEDICATIONS  Home Medications    **Home medications have not yet been reviewed for this encounter**       Audit from Redirected Encounters    **Home medications have not yet been reviewed for this encounter**         ALLERGIES  No Known Allergies    PHYSICAL EXAM  VITAL SIGNS: BP (!) 230/93   Pulse (!) 52   Temp 35.9 °C (96.6 °F) (Temporal)   Ht 1.626 m (5' 4\")   Wt 77.1 kg (170 lb)   SpO2 97%   BMI 29.18 kg/m²      Constitutional: Well developed, Well nourished, No acute distress, Non-toxic appearance.   HEENT: Normocephalic, Left forehead hematoma,  external ears normal, pharynx pink,  Mucous  Membranes moist, No rhinorrhea or mucosal edema  Eyes: PERRL, EOMI, Conjunctiva normal, No discharge.   Neck: Normal range of motion, No tenderness, Supple, No stridor.   Cardiovascular: Regular Rate and Rhythm, No murmurs,  rubs, or gallops.   Thorax & Lungs: Lungs clear to auscultation bilaterally, No respiratory distress, No wheezes, rhales or rhonchi, No chest wall tenderness.   Abdomen: Soft, non tender, non distended,  No pulsatile masses., no rebound guarding or peritoneal signs.   Skin: Warm, Dry, No erythema, No rash, Bruising to the left palm  Back:  No CVA tenderness,  No spinal tenderness, bony crepitance step offs or instability.   Extremities: Equal, intact distal pulses, No cyanosis, clubbing or edema, Tenderness to left ankle however has full range of motion to the ankle, good range of motion of the left hip  Musculoskeletal: Good range of motion in all " major joints. No tenderness to palpation or major deformities noted.   Neurologic: Alert & oriented No focal deficits noted.  Psychiatric: Affect normal, Judgment normal, Mood normal.    RADIOLOGY/PROCEDURES   I have independently interpreted the diagnostic imaging associated with this visit and am waiting the final reading from the radiologist.   My preliminary interpretation is as follows: no ICH    Radiologist interpretation:  DX-ANKLE 3+ VIEWS LEFT   Final Result      No evidence of fracture or dislocation.      CT-CSPINE WITHOUT PLUS RECONS   Final Result      1.  No acute fracture or dislocation seen in the CT scan of the cervical spine.      2.  Thyroid nodules are identified. Follow-up outpatient thyroid ultrasound is recommended for further evaluation.      CT-HEAD W/O   Final Result         NO ACUTE ABNORMALITIES ARE NOTED ON CT SCAN OF THE HEAD.      Findings are consistent with atrophy.  Decreased attenuation in the periventricular white matter likely indicates microvascular ischemic disease.                      COURSE & MEDICAL DECISION MAKING    ASSESSMENT, COURSE AND PLAN  Care Narrative:   This is a 79-year-old female who presents as a TBI alert.  The patient was wearing different shoes today and she tripped on a step and fell and hit her head on the ground.  She not lose consciousness.  She is on baby aspirin daily.  On arrival, she is GCS 15.  She was evaluated at the charge nurse status.  She was taken to CT.  CT head demonstrates no ICH or skull fracture.  There is no evidence of C-spine fracture on CT C-spine.  I was unable to clear her with Nexus criteria but she has no neck pain.  She has no neurologic deficit.  She was found to have thyroid nodules and patient is already aware of this and is following up with her primary care doctor for this.  She did have some mild left ankle pain therefore x-ray was obtained and shows no evidence of fracture.  This fall was purely mechanical.  She is  feeling well today.  There is no indication for laboratory testing at this time.  Patient's blood pressure was noted to be high here she has history of previous and states that she has whitecoat hypertension.  She has no chest pain, shortness of breath or any other concerns concerning for hypertensive emergency. According to ACEP guidelines chronic hypertension that is not currently symptomatic does not need to be evaluated in the emergency department.  I did offer to keep her in the emergency room for further assessment to ensure that her blood pressure improves but patient and her daughter were eager to go home if she is feeling well. She states that last time that she was in the emergency room her blood pressure was even higher and she was discharged home therefore she does not feel like she needs to stay.  She will monitor her blood pressure at home and follow-up with primary care doctor to titrate her blood pressure medications as needed.  Patient is ambulatory and has no further complaints of pain.  Patient is advised to come back for any new or worsening symptoms.  She is agreeable to discharge home no further questions.              ADDITIONAL PROBLEMS MANAGED  None    DISPOSITION AND DISCUSSIONS  I have discussed management of the patient with the following physicians and MADELEINE's:  None    Discussion of management with other QHP or appropriate source(s): None     Escalation of care considered, and ultimately not performed:Laboratory analysis    Barriers to care at this time, including but not limited to:  None .     Decision tools and prescription drugs considered including, but not limited to:  None .     The patient will return for new or worsening symptoms and is stable at the time of discharge.    The patient is referred to a primary physician for blood pressure management, diabetic screening, and for all other preventative health concerns.      DISPOSITION:  Patient will be discharged home in stable  condition.    FOLLOW UP:  YAEL CifuentesRWillardN.  [address]  [address]  [facility] [address]  [phone]          [facility], Emergency Dept  [address] [facility] [address]  [phone]          OUTPATIENT MEDICATIONS:  Discharge Medication List as of 3/15/2025  8:21 PM            FINAL DIAGNOSIS  1. Closed head injury, initial encounter Acute   2. Fall, initial encounter Acute   3. Acute left ankle pain Acute   4. Elevated blood pressure reading Acute        Electronically signed by: Lorena Felipe M.D., 3/15/2025 6:22 PM

## 2025-03-18 ENCOUNTER — HOSPITAL ENCOUNTER (OUTPATIENT)
Dept: RADIOLOGY | Facility: MEDICAL CENTER | Age: 80
End: 2025-03-18
Attending: FAMILY MEDICINE
Payer: MEDICARE

## 2025-03-18 ENCOUNTER — OFFICE VISIT (OUTPATIENT)
Dept: MEDICAL GROUP | Facility: MEDICAL CENTER | Age: 80
End: 2025-03-18
Payer: MEDICARE

## 2025-03-18 ENCOUNTER — RESULTS FOLLOW-UP (OUTPATIENT)
Dept: MEDICAL GROUP | Facility: MEDICAL CENTER | Age: 80
End: 2025-03-18

## 2025-03-18 ENCOUNTER — HOSPITAL ENCOUNTER (OUTPATIENT)
Facility: MEDICAL CENTER | Age: 80
End: 2025-03-18
Attending: FAMILY MEDICINE
Payer: MEDICARE

## 2025-03-18 VITALS
HEART RATE: 52 BPM | BODY MASS INDEX: 27.66 KG/M2 | DIASTOLIC BLOOD PRESSURE: 82 MMHG | OXYGEN SATURATION: 94 % | HEIGHT: 64 IN | WEIGHT: 162 LBS | SYSTOLIC BLOOD PRESSURE: 180 MMHG | TEMPERATURE: 97.6 F

## 2025-03-18 DIAGNOSIS — M06.9 RHEUMATOID ARTHRITIS INVOLVING MULTIPLE SITES, UNSPECIFIED WHETHER RHEUMATOID FACTOR PRESENT (HCC): ICD-10-CM

## 2025-03-18 DIAGNOSIS — R07.89 LEFT-SIDED CHEST WALL PAIN: ICD-10-CM

## 2025-03-18 DIAGNOSIS — M54.6 CHRONIC MIDLINE THORACIC BACK PAIN: ICD-10-CM

## 2025-03-18 DIAGNOSIS — G89.29 CHRONIC MIDLINE THORACIC BACK PAIN: ICD-10-CM

## 2025-03-18 DIAGNOSIS — R56.9 CONVULSIONS, UNSPECIFIED CONVULSION TYPE (HCC): ICD-10-CM

## 2025-03-18 DIAGNOSIS — Z79.899 HIGH RISK MEDICATION USE: ICD-10-CM

## 2025-03-18 PROCEDURE — 99214 OFFICE O/P EST MOD 30 MIN: CPT | Performed by: FAMILY MEDICINE

## 2025-03-18 PROCEDURE — 3077F SYST BP >= 140 MM HG: CPT | Performed by: FAMILY MEDICINE

## 2025-03-18 PROCEDURE — 80307 DRUG TEST PRSMV CHEM ANLYZR: CPT

## 2025-03-18 PROCEDURE — 71101 X-RAY EXAM UNILAT RIBS/CHEST: CPT | Mod: LT

## 2025-03-18 PROCEDURE — G0480 DRUG TEST DEF 1-7 CLASSES: HCPCS

## 2025-03-18 PROCEDURE — 3079F DIAST BP 80-89 MM HG: CPT | Performed by: FAMILY MEDICINE

## 2025-03-18 RX ORDER — HYDROCODONE BITARTRATE AND ACETAMINOPHEN 5; 325 MG/1; MG/1
1 TABLET ORAL EVERY 6 HOURS PRN
Qty: 20 TABLET | Refills: 0 | Status: SHIPPED | OUTPATIENT
Start: 2025-03-18 | End: 2025-03-28

## 2025-03-18 RX ORDER — ONDANSETRON 4 MG/1
4 TABLET, ORALLY DISINTEGRATING ORAL EVERY 6 HOURS PRN
Qty: 10 TABLET | Refills: 2 | Status: SHIPPED | OUTPATIENT
Start: 2025-03-18

## 2025-03-18 ASSESSMENT — FIBROSIS 4 INDEX: FIB4 SCORE: 1.42

## 2025-03-18 NOTE — PROGRESS NOTES
Verbal consent was acquired by the patient to use [employer] ambient listening note generation during this visit:  Yes      Chief complaint::Diagnoses of Left-sided chest wall pain, Chronic midline thoracic back pain, High risk medication use, Rheumatoid arthritis involving multiple sites, unspecified whether rheumatoid factor present (HCC), and Convulsions, unspecified convulsion type (HCC) were pertinent to this visit.    Assessment and Plan:   The following treatment plan was discussed:  HCC Gap Form    Diagnosis to address: M06.9 - Rheumatoid arthritis involving multiple sites, unspecified whether rheumatoid factor present (HCC)  Assessment and plan: Chronic, stable. Continue with current defined treatment plan: Taking Prednisone 2.5 mg daily. Follow-up at least annually.  Diagnosis: R56.9 - Unspecified convulsions (HCC)  Assessment and plan: Chronic, stable. Continue with current defined treatment plan: Continue Keppra 500 mg twice daily. Follow-up at least annually.  Last edited 03/18/25 15:44 PDT by AILEEN Cifuentes        Assessment & Plan  1. Left chest wall pain: Acute. Ordered rib x-ray to rule out fractures.  - Prescribed Norco 5 mg and Zofran 4 mg ODT, advising to break Norco in half and take with Zofran to manage pain and prevent nausea  - Signed controlled substance agreement  - Urine will be collected for testing  - Prescription sent to [facility] in [place]  - Advised to contact the clinic if no improvement  -I do suspect that her elevated blood pressure today is secondary to uncontrolled pain that she has been experiencing.    2. Chronic thoracic and low back pain: Chronic, unstable. MRI shows multiple posterior bulging discs, paracentral herniation, foraminal disk causing bilateral recess stenosis, right central foraminal disk herniation, superimposed impulsive disk denting the ventricular thecal sac causing mild right lateral recess stenosis, and levoscoliosis of the  thoracolumbar spine with apex at L2.  - Advised to follow up with neurosurgeon Dr. Fontana on Friday to discuss further management, including potential corticosteroid injections or nerve blocks  - Referred to home health services for physical therapy and strength training at home    3.  Hypertension.  Chronic, unstable.  Elevated today, likely due to pain. Previously recorded at 132/74 in November 2024.  - Advised to monitor blood pressure regularly  -Will work on pain reduction to see if we can get improvement in her blood pressure.  Last November her blood pressure was at goal.  -If no improvement in her plain, consider adjustment of her blood pressure medication for better control    4. Home care assistance.  - Referred to home health services to evaluate needs for assistance with daily activities such as grocery shopping  - Evaluation within 48 to 72 hours    Follow-up  - Follow up with neurosurgeon Dr. Barraza on Friday  Anaid was seen today for hospital follow-up and referral needed.    Diagnoses and all orders for this visit:    Left-sided chest wall pain  -     [id number] (WITH 1-VIEW CXR) LEFT; Future  -     ondansetron (ZOFRAN ODT) 4 MG TABLET DISPERSIBLE; Take 1 Tablet by mouth every 6 hours as needed for Nausea/Vomiting.  -     Controlled Substance Treatment Agreement  -     URINE DRUG SCREEN W/CONF (AR); Future  -     HYDROcodone-acetaminophen (NORCO) 5-325 MG Tab per tablet; Take 1 Tablet by mouth every 6 hours as needed (severe pain) for up to 10 days.    Chronic midline thoracic back pain  -     Referral to Home Health  -     ondansetron (ZOFRAN ODT) 4 MG TABLET DISPERSIBLE; Take 1 Tablet by mouth every 6 hours as needed for Nausea/Vomiting.  -     Controlled Substance Treatment Agreement  -     URINE DRUG SCREEN W/CONF (AR); Future  -     HYDROcodone-acetaminophen (NORCO) 5-325 MG Tab per tablet; Take 1 Tablet by mouth every 6 hours as needed (severe pain) for up to 10 days.  -     REFERRAL TO  CARE MANAGEMENT    High risk medication use  -     URINE DRUG SCREEN W/CONF (AR); Future    Rheumatoid arthritis involving multiple sites, unspecified whether rheumatoid factor present (HCC)  -     REFERRAL TO CARE MANAGEMENT    Convulsions, unspecified convulsion type (HCC)  -     REFERRAL TO CARE MANAGEMENT        Followup: Return if symptoms worsen or fail to improve.      I have placed urine drug screen orders.  The MA is preforming urine drug screen orders under the direction of Dr. Kirkpatrick    Subjective/HPI:   HPI:    Anaid Aponte is a pleasant 79 y.o. female here for   Chief Complaint   Patient presents with    Hospital Follow-up    Referral Needed     Home health         History of Present Illness  The patient is a 79-year-old individual presenting with left chest wall pain, chronic thoracic and low back pain, and blood pressure management.    They were seen in the ER for a closed head injury after tripping and falling outside the garage, hitting the left side of their head, resulting in a left forehead hematoma. EMS was called as they couldn't get up, but they walked to the [facility]. They have soreness in the left thigh, left ankle pain but can ambulate, and bruising on the left palm. They felt well earlier that day. Chronic back pain is stable, with no increase. Imaging of the left ankle showed no fracture or dislocation. CT of the C-spine showed no acute fracture or dislocation but identified thyroid nodules, recommending a follow-up thyroid ultrasound. CT of the head showed no acute abnormalities but noted age-related atrophy and microvascular ischemic disease.    They report severe chest pain, similar to a previous rib fracture, onset yesterday after a fall on Saturday night. Pain increases with palpation and causes difficulty breathing. They also have mild back pain and breast discomfort. Current analgesics include Tylenol.    They monitor their blood pressure regularly, expressing concern as it  remains elevated today, though less so than previously.    Severe back pain causes them to sit until 1100 hours daily, leading to feelings of disgust and defeat. The pain is dull and achy, transitioning from the lower back to the side. They have a follow-up with Dr. Barraza on Friday. They use Deep Blue and Advil for pain, discontinued meloxicam due to ineffectiveness, and are on a continuous low-dose steroid regimen, using Valium for MRI procedures. Past pain medications include Percocet, morphine, and tramadol, which caused GI upset. They have not tried Vicodin or Norco. They attended physical therapy three times and inquired about a block.    Their daughter is concerned about their ability to care for themselves and is considering assisted living.    SOCIAL HISTORY  They do not report any personal history of alcohol, illegal drug, or prescription drug abuse.    FAMILY HISTORY  They do not report any family history of alcohol, illegal drug, or prescription drug abuse.    MEDICATIONS  Current: baby aspirin, Tylenol, Advil (as needed), continuous low-dose steroid, Valium (as needed for MRI)  Discontinued: meloxicam    Current Medicines (including changes today)  Current Outpatient Medications   Medication Sig Dispense Refill    ondansetron (ZOFRAN ODT) 4 MG TABLET DISPERSIBLE Take 1 Tablet by mouth every 6 hours as needed for Nausea/Vomiting. 10 Tablet 2    HYDROcodone-acetaminophen (NORCO) 5-325 MG Tab per tablet Take 1 Tablet by mouth every 6 hours as needed (severe pain) for up to 10 days. 20 Tablet 0    BREO ELLIPTA 100-25 MCG/ACT AEROSOL POWDER, BREATH ACTIVATED INHALE 1 PUFF EVERY DAY. INHALE ONE PUFF INTO THE LUNGS DAILY 60 Each 2    orphenadrine (NORFLEX) 100 MG tablet Take 1 Tablet by mouth 2 times a day. 20 Tablet 0    carvedilol (COREG) 25 MG Tab Take 1 Tablet by mouth 2 times a day. 180 Tablet 3    ezetimibe (ZETIA) 10 MG Tab Take 1 Tablet by mouth every day. 90 Tablet 3    hydroCHLOROthiazide 25 MG Tab  "Take 1 Tablet by mouth every day. Take 1 tablet (25 mg total) by mouth daily. 90 Tablet 3    levETIRAcetam (KEPPRA) 500 MG Tab Take 1 Tablet by mouth 2 times a day. 180 Tablet 3    losartan (COZAAR) 100 MG Tab Take 1 Tablet by mouth at bedtime. 90 Tablet 3    prednisONE (DELTASONE) 2.5 MG Tab Take ONE tablet (2.5 MG total) by MOUTH daily. Take with food 90 Tablet 3     No current facility-administered medications for this visit.     Past Medical/ Surgical History  She  has a past medical history of COPD (chronic obstructive pulmonary disease) (HCC) (07/29/2024), Diverticulitis, Encephalitis (07/29/2024), Hyperlipidemia (07/29/2024), Polyarthritis rheumatica (HCC) (07/29/2024), Primary hypertension (07/29/2024), and Thyroid nodule.  She  has a past surgical history that includes other abdominal surgery and knee replacement, total.       Objective:   BP (!) 180/82   Pulse (!) 52   Temp 36.4 °C (97.6 °F)   Ht 1.626 m (5' 4\")   Wt 73.5 kg (162 lb)   SpO2 94%  Body mass index is 27.81 kg/m².    Physical Exam  Constitutional:       General: She is not in acute distress.     Appearance: She is not ill-appearing or toxic-appearing.   HENT:      Head: Normocephalic.      Right Ear: Tympanic membrane and external ear normal.      Left Ear: Tympanic membrane and external ear normal.      Nose: Nose normal. No rhinorrhea.      Mouth/Throat:      Mouth: Mucous membranes are moist.      Pharynx: Oropharynx is clear. No posterior oropharyngeal erythema.   Eyes:      General:         Right eye: No discharge.         Left eye: No discharge.      Conjunctiva/sclera: Conjunctivae normal.      Pupils: Pupils are equal, round, and reactive to light.   Cardiovascular:      Rate and Rhythm: Normal rate and regular rhythm.      Heart sounds: No murmur heard.  Pulmonary:      Effort: Pulmonary effort is normal. No respiratory distress.      Breath sounds: Normal breath sounds. No wheezing.   Chest:          Comments: Tenderness with " palpation  Abdominal:      General: Abdomen is flat.   Musculoskeletal:         General: No swelling or tenderness.      Cervical back: Normal range of motion and neck supple.      Right lower leg: No edema.      Left lower leg: No edema.   Skin:     General: Skin is warm.      Findings: Bruising (Extensive bruising over the left eye up and to the left side of the forehead.  Left palmar aspect of her hand) present.   Neurological:      General: No focal deficit present.      Mental Status: She is alert and oriented to person, place, and time. Mental status is at baseline.   Psychiatric:         Mood and Affect: Mood normal.          Lab/ Imaging Results:  Results  - Imaging:    - CT scan of C-spine:      - No acute fracture or dislocation      - Identified thyroid nodules    - CT scan of the head:      - No acute abnormalities      - Findings consistent with age-related atrophy and microvascular ischemic disease    - Imaging of left ankle:      - No evidence of fracture or dislocation    Please note that this dictation was created using voice recognition software. I have made every reasonable attempt to correct obvious errors, but I expect that there are errors of grammar and possibly content that I did not discover before finalizing the note.

## 2025-03-19 ENCOUNTER — HOME HEALTH ADMISSION (OUTPATIENT)
Dept: HOME HEALTH SERVICES | Facility: HOME HEALTHCARE | Age: 80
End: 2025-03-19
Payer: MEDICARE

## 2025-03-20 ENCOUNTER — RESULTS FOLLOW-UP (OUTPATIENT)
Dept: MEDICAL GROUP | Facility: MEDICAL CENTER | Age: 80
End: 2025-03-20
Payer: MEDICARE

## 2025-03-20 LAB
AMPHET CTO UR CFM-MCNC: NEGATIVE NG/ML
BARBITURATES CTO UR CFM-MCNC: NEGATIVE NG/ML
BENZODIAZ CTO UR CFM-MCNC: NORMAL NG/ML
CANNABINOIDS CTO UR CFM-MCNC: NEGATIVE NG/ML
COCAINE CTO UR CFM-MCNC: NEGATIVE NG/ML
CREAT UR-MCNC: 138.5 MG/DL (ref 20–400)
DRUG COMMENT 753798: NORMAL
METHADONE CTO UR CFM-MCNC: NEGATIVE NG/ML
OPIATES CTO UR CFM-MCNC: NEGATIVE NG/ML
PCP CTO UR CFM-MCNC: NEGATIVE NG/ML
PROPOXYPH CTO UR CFM-MCNC: NEGATIVE NG/ML

## 2025-03-21 ENCOUNTER — TELEPHONE (OUTPATIENT)
Dept: HOME HEALTH SERVICES | Facility: HOME HEALTHCARE | Age: 80
End: 2025-03-21
Payer: MEDICARE

## 2025-03-21 NOTE — TELEPHONE ENCOUNTER
Spoke with patient and discussed scheduling delay. Per patient unsure they need HH. Per request to call back on Tuesday. Later start of care for 3/25 per request.

## 2025-03-23 LAB
1OH-MIDAZOLAM UR CFM-MCNC: <20 NG/ML
7AMINOCLONAZEPAM UR CFM-MCNC: <5 NG/ML
A-OH ALPRAZ UR CFM-MCNC: <5 NG/ML
ALPRAZ UR CFM-MCNC: <5 NG/ML
CHLORDIAZEP UR CFM-MCNC: <20 NG/ML
CLONAZEPAM UR CFM-MCNC: <5 NG/ML
DIAZEPAM UR CFM-MCNC: <20 NG/ML
LORAZEPAM UR CFM-MCNC: <20 NG/ML
MIDAZOLAM UR CFM-MCNC: <20 NG/ML
NORDIAZEPAM UR CFM-MCNC: 48 NG/ML
OXAZEPAM UR CFM-MCNC: 28 NG/ML
TEMAZEPAM UR CFM-MCNC: 68 NG/ML

## 2025-03-23 PROCEDURE — G0480 DRUG TEST DEF 1-7 CLASSES: HCPCS

## 2025-03-25 ENCOUNTER — TELEPHONE (OUTPATIENT)
Dept: HOME HEALTH SERVICES | Facility: HOME HEALTHCARE | Age: 80
End: 2025-03-25
Payer: MEDICARE

## 2025-03-25 ENCOUNTER — PATIENT OUTREACH (OUTPATIENT)
Dept: HEALTH INFORMATION MANAGEMENT | Facility: OTHER | Age: 80
End: 2025-03-25
Payer: MEDICARE

## 2025-03-26 ENCOUNTER — TELEPHONE (OUTPATIENT)
Dept: HOME HEALTH SERVICES | Facility: HOME HEALTHCARE | Age: 80
End: 2025-03-26
Payer: MEDICARE

## 2025-03-26 NOTE — PROGRESS NOTES
Community Health Worker Intake     Synopsis: CHW received referral from patient's PCP to help the patient as she is needing someone who can provide home care services as her daughter cannot be with her all the time. CHW completed call with patient over phone and she states herself and her daughter differ about her needing additional assistance. The patient states she also recently spoke with a home health associate and declined their services as well. Anaid states she doesn't want anyone in her home at this time. She thanked this chw for the outreach and concern for her care needs. The patient was encouraged to contact her PCP or care management for future care coordination or resources. No other needs at this time.

## 2025-03-31 DIAGNOSIS — R05.3 CHRONIC COUGH: ICD-10-CM

## 2025-03-31 DIAGNOSIS — J44.9 CHRONIC OBSTRUCTIVE PULMONARY DISEASE, UNSPECIFIED COPD TYPE (HCC): ICD-10-CM

## 2025-04-01 DIAGNOSIS — J44.9 CHRONIC OBSTRUCTIVE PULMONARY DISEASE, UNSPECIFIED COPD TYPE (HCC): ICD-10-CM

## 2025-04-01 DIAGNOSIS — R05.3 CHRONIC COUGH: ICD-10-CM

## 2025-04-01 RX ORDER — PREDNISONE 20 MG/1
TABLET ORAL
Qty: 11 TABLET | Refills: 0 | Status: SHIPPED | OUTPATIENT
Start: 2025-04-01 | End: 2025-04-08

## 2025-04-01 RX ORDER — UMECLIDINIUM BROMIDE AND VILANTEROL TRIFENATATE 62.5; 25 UG/1; UG/1
1 POWDER RESPIRATORY (INHALATION) DAILY
Qty: 60 EACH | Refills: 11 | Status: SHIPPED | OUTPATIENT
Start: 2025-04-01

## 2025-04-01 NOTE — PROGRESS NOTES
Stopped the Breo Ellipta.  Switched over to Anoro Ellipta.  Increased the prednisone for 1 week, go back down to 2.5 after that

## 2025-04-23 DIAGNOSIS — I10 PRIMARY HYPERTENSION: ICD-10-CM

## 2025-04-24 RX ORDER — CARVEDILOL 25 MG/1
25 TABLET ORAL 2 TIMES DAILY
Qty: 180 TABLET | Refills: 2 | Status: SHIPPED | OUTPATIENT
Start: 2025-04-24

## 2025-06-12 ENCOUNTER — PATIENT MESSAGE (OUTPATIENT)
Dept: MEDICAL GROUP | Facility: MEDICAL CENTER | Age: 80
End: 2025-06-12
Payer: MEDICARE

## 2025-06-12 DIAGNOSIS — R56.9 SEIZURE (HCC): Primary | ICD-10-CM

## 2025-06-26 DIAGNOSIS — R05.3 CHRONIC COUGH: ICD-10-CM

## 2025-06-26 DIAGNOSIS — J44.9 CHRONIC OBSTRUCTIVE PULMONARY DISEASE, UNSPECIFIED COPD TYPE (HCC): ICD-10-CM

## 2025-06-27 RX ORDER — UMECLIDINIUM BROMIDE AND VILANTEROL TRIFENATATE 62.5; 25 UG/1; UG/1
1 POWDER RESPIRATORY (INHALATION) DAILY
Qty: 60 EACH | Refills: 11 | Status: SHIPPED | OUTPATIENT
Start: 2025-06-27

## 2025-07-10 ENCOUNTER — APPOINTMENT (OUTPATIENT)
Dept: MEDICAL GROUP | Facility: MEDICAL CENTER | Age: 80
End: 2025-07-10
Payer: MEDICARE

## 2025-07-10 VITALS
DIASTOLIC BLOOD PRESSURE: 72 MMHG | HEIGHT: 64 IN | TEMPERATURE: 97.3 F | BODY MASS INDEX: 27.14 KG/M2 | SYSTOLIC BLOOD PRESSURE: 124 MMHG | HEART RATE: 83 BPM | WEIGHT: 159 LBS | OXYGEN SATURATION: 94 %

## 2025-07-10 DIAGNOSIS — J44.9 CHRONIC OBSTRUCTIVE PULMONARY DISEASE, UNSPECIFIED COPD TYPE (HCC): ICD-10-CM

## 2025-07-10 DIAGNOSIS — R07.89 LEFT-SIDED CHEST WALL PAIN: ICD-10-CM

## 2025-07-10 DIAGNOSIS — G04.90 ENCEPHALITIS: ICD-10-CM

## 2025-07-10 DIAGNOSIS — Z78.0 ENCOUNTER FOR OSTEOPOROSIS SCREENING IN ASYMPTOMATIC POSTMENOPAUSAL PATIENT: Primary | ICD-10-CM

## 2025-07-10 DIAGNOSIS — M54.6 CHRONIC MIDLINE THORACIC BACK PAIN: ICD-10-CM

## 2025-07-10 DIAGNOSIS — F41.9 ANXIETY: ICD-10-CM

## 2025-07-10 DIAGNOSIS — Z13.820 ENCOUNTER FOR OSTEOPOROSIS SCREENING IN ASYMPTOMATIC POSTMENOPAUSAL PATIENT: Primary | ICD-10-CM

## 2025-07-10 DIAGNOSIS — G89.29 CHRONIC MIDLINE THORACIC BACK PAIN: ICD-10-CM

## 2025-07-10 DIAGNOSIS — L60.0 INGROWN TOENAIL: ICD-10-CM

## 2025-07-10 DIAGNOSIS — F43.9 STRESS: ICD-10-CM

## 2025-07-10 DIAGNOSIS — R05.3 CHRONIC COUGH: ICD-10-CM

## 2025-07-10 DIAGNOSIS — R73.09 ELEVATED GLUCOSE LEVEL: ICD-10-CM

## 2025-07-10 DIAGNOSIS — M06.9 RHEUMATOID ARTHRITIS INVOLVING MULTIPLE SITES, UNSPECIFIED WHETHER RHEUMATOID FACTOR PRESENT (HCC): ICD-10-CM

## 2025-07-10 DIAGNOSIS — I10 PRIMARY HYPERTENSION: ICD-10-CM

## 2025-07-10 DIAGNOSIS — R19.7 DIARRHEA, UNSPECIFIED TYPE: ICD-10-CM

## 2025-07-10 DIAGNOSIS — E78.5 HYPERLIPIDEMIA, UNSPECIFIED HYPERLIPIDEMIA TYPE: ICD-10-CM

## 2025-07-10 PROCEDURE — 3074F SYST BP LT 130 MM HG: CPT | Performed by: FAMILY MEDICINE

## 2025-07-10 PROCEDURE — 99214 OFFICE O/P EST MOD 30 MIN: CPT | Performed by: FAMILY MEDICINE

## 2025-07-10 PROCEDURE — 3078F DIAST BP <80 MM HG: CPT | Performed by: FAMILY MEDICINE

## 2025-07-10 RX ORDER — DOXYCYCLINE HYCLATE 100 MG
TABLET ORAL
COMMUNITY
Start: 2025-07-05 | End: 2025-07-10

## 2025-07-10 RX ORDER — HYDROCHLOROTHIAZIDE 25 MG/1
25 TABLET ORAL DAILY
Qty: 90 TABLET | Refills: 3 | Status: ON HOLD | OUTPATIENT
Start: 2025-07-10 | End: 2025-07-31

## 2025-07-10 RX ORDER — LOSARTAN POTASSIUM 100 MG/1
100 TABLET ORAL
Qty: 90 TABLET | Refills: 3 | Status: ON HOLD | OUTPATIENT
Start: 2025-07-10 | End: 2025-07-31

## 2025-07-10 RX ORDER — LIDOCAINE 50 MG/G
PATCH TOPICAL
COMMUNITY
Start: 2025-07-09 | End: 2025-07-10

## 2025-07-10 RX ORDER — FLUTICASONE PROPIONATE 50 MCG
SPRAY, SUSPENSION (ML) NASAL
COMMUNITY
Start: 2025-07-05 | End: 2025-07-10

## 2025-07-10 RX ORDER — CARVEDILOL 25 MG/1
25 TABLET ORAL 2 TIMES DAILY
Qty: 180 TABLET | Refills: 2 | Status: ON HOLD | OUTPATIENT
Start: 2025-07-10 | End: 2025-07-31

## 2025-07-10 RX ORDER — MELOXICAM 15 MG/1
TABLET ORAL
COMMUNITY
Start: 2025-04-23 | End: 2025-07-10

## 2025-07-10 RX ORDER — EZETIMIBE 10 MG/1
10 TABLET ORAL
Qty: 90 TABLET | Refills: 3 | Status: SHIPPED | OUTPATIENT
Start: 2025-07-10

## 2025-07-10 RX ORDER — PREDNISONE 2.5 MG/1
TABLET ORAL
Qty: 90 TABLET | Refills: 3 | Status: SHIPPED | OUTPATIENT
Start: 2025-07-10

## 2025-07-10 RX ORDER — UMECLIDINIUM BROMIDE AND VILANTEROL TRIFENATATE 62.5; 25 UG/1; UG/1
1 POWDER RESPIRATORY (INHALATION) DAILY
Qty: 60 EACH | Refills: 11 | Status: SHIPPED | OUTPATIENT
Start: 2025-07-10

## 2025-07-10 RX ORDER — SERTRALINE HYDROCHLORIDE 25 MG/1
TABLET, FILM COATED ORAL
Qty: 42 TABLET | Refills: 0 | Status: SHIPPED | OUTPATIENT
Start: 2025-07-10 | End: 2025-07-28

## 2025-07-10 RX ORDER — ONDANSETRON 4 MG/1
4 TABLET, ORALLY DISINTEGRATING ORAL EVERY 6 HOURS PRN
Qty: 10 TABLET | Refills: 2 | Status: SHIPPED | OUTPATIENT
Start: 2025-07-10

## 2025-07-10 RX ORDER — LEVETIRACETAM 500 MG/1
500 TABLET ORAL 2 TIMES DAILY
Qty: 180 TABLET | Refills: 3 | Status: SHIPPED | OUTPATIENT
Start: 2025-07-10

## 2025-07-10 ASSESSMENT — FIBROSIS 4 INDEX: FIB4 SCORE: 1.42

## 2025-07-10 NOTE — PROGRESS NOTES
Verbal consent was acquired by the patient to use [employer] ambient listening note generation during this visit:  Yes      Chief complaint::The primary encounter diagnosis was Encounter for osteoporosis screening in asymptomatic postmenopausal patient. Diagnoses of Rheumatoid arthritis involving multiple sites, unspecified whether rheumatoid factor present (HCC), Left-sided chest wall pain, Chronic midline thoracic back pain, Primary hypertension, Encephalitis, Hyperlipidemia, unspecified hyperlipidemia type, Chronic obstructive pulmonary disease, unspecified COPD type (HCC), Chronic cough, Elevated glucose level, Diarrhea, unspecified type, Anxiety, Stress, and Ingrown toenail were also pertinent to this visit.    Assessment and Plan:   The following treatment plan was discussed:     Assessment & Plan  1. Diarrhea: Acute.  Possible gastritis or gut inflammation.  - Advised probiotics, fiber supplements (psyllium husk), bland diet avoiding salt, seasoning, spicy, greasy, processed foods, and dairy.  - Consider stool studies and gastroenterology referral if symptoms persist.    2. Hypertension.  Chronic, stable  - Monitor blood pressure at home.  - Resume losartan, carvedilol, and hydrochlorothiazide if blood pressure increases.    3. Cough.  Subacute.  - Use Flonase followed by saline spray for nasal passages.  - Take Claritin to dry mucous membranes.    4. Seizure disorder.  Chronic, stable.  - Continue Keppra 500 mg twice daily.  - Neurologist appointment in 09/2025.    5. Anxiety.  Chronic, unstable.  - Start Zoloft 25 mg for 2 weeks, increase to 50 mg if ineffective.    6. Skin lesion: Appears to be squamous cell carcinoma.  - Referral to dermatology recommended.  - Appointment with Dr. Rahman on 08/04/2025 at 1315 hours.    7. Health maintenance.  - Ordered DEXA scan for osteoporosis screening.  - Blood work today for A1c levels and kidney function.    Follow-up  - Follow up in 4 weeks.  Anaid was seen  today for medication refill and orders needed.    Diagnoses and all orders for this visit:    Encounter for osteoporosis screening in asymptomatic postmenopausal patient  -     DS-BONE DENSITY STUDY (DEXA); Future    Rheumatoid arthritis involving multiple sites, unspecified whether rheumatoid factor present (HCC)  -     prednisONE (DELTASONE) 2.5 MG Tab; Take ONE tablet (2.5 MG total) by MOUTH daily. Take with food    Left-sided chest wall pain  -     ondansetron (ZOFRAN ODT) 4 MG TABLET DISPERSIBLE; Take 1 Tablet by mouth every 6 hours as needed for Nausea/Vomiting.    Chronic midline thoracic back pain  -     ondansetron (ZOFRAN ODT) 4 MG TABLET DISPERSIBLE; Take 1 Tablet by mouth every 6 hours as needed for Nausea/Vomiting.    Primary hypertension  -     losartan (COZAAR) 100 MG Tab; Take 1 Tablet by mouth at bedtime.  -     hydroCHLOROthiazide 25 MG Tab; Take 1 Tablet by mouth every day. Take 1 tablet (25 mg total) by mouth daily.  -     carvedilol (COREG) 25 MG Tab; Take 1 Tablet by mouth 2 times a day.  -     CBC WITH DIFFERENTIAL; Future  -     Comp Metabolic Panel; Future  -     ESTIMATED GFR; Future    Encephalitis  -     levETIRAcetam (KEPPRA) 500 MG Tab; Take 1 Tablet by mouth 2 times a day.    Hyperlipidemia, unspecified hyperlipidemia type  -     ezetimibe (ZETIA) 10 MG Tab; Take 1 Tablet by mouth every day.  -     Comp Metabolic Panel; Future  -     ESTIMATED GFR; Future  -     Lipid Profile; Future    Chronic obstructive pulmonary disease, unspecified COPD type (HCC)  -     umeclidinium-vilanterol (ANORO ELLIPTA) 62.5-25 MCG/ACT AEROSOL POWDER, BREATH ACTIVATED inhaler; Inhale 1 Puff every day.    Chronic cough  -     umeclidinium-vilanterol (ANORO ELLIPTA) 62.5-25 MCG/ACT AEROSOL POWDER, BREATH ACTIVATED inhaler; Inhale 1 Puff every day.    Elevated glucose level  -     Comp Metabolic Panel; Future  -     ESTIMATED GFR; Future  -     HEMOGLOBIN A1C; Future    Diarrhea, unspecified type  -     CBC  WITH DIFFERENTIAL; Future  -     Comp Metabolic Panel; Future  -     ESTIMATED GFR; Future  -     TSH+FREE T4  -     HEMOGLOBIN A1C; Future    Anxiety  -     sertraline (ZOLOFT) 25 MG tablet; Take 1 Tablet by mouth every day for 14 days, THEN 2 Tablets every day for 14 days.    Stress  -     sertraline (ZOLOFT) 25 MG tablet; Take 1 Tablet by mouth every day for 14 days, THEN 2 Tablets every day for 14 days.    Ingrown toenail  -     Referral to Podiatry        Followup: Return in about 4 weeks (around 8/7/2025) for anxiety check.    Subjective/HPI:     HPI:    Anaid Aponte is a pleasant 79 y.o. female here for   Chief Complaint   Patient presents with    Medication Refill    Orders Needed        History of Present Illness  The patient is a 79-year-old individual presenting for medication refill, accompanied by their child.    Severe Diarrhea  - Severe diarrhea for several days, leading to weight loss and decreased appetite  - Diarrhea is watery and black  - 5 episodes yesterday and 2 today  - No recent antibiotics  - Managed symptoms with Kaopectate, Imodium, probiotics, and fiber supplements    Blood Pressure  - Blood pressure improved without medication  - Currently on losartan, carvedilol, and hydrochlorothiazide    Cough  - Cough treated with prednisone, but has returned  - Prescribed prednisone, Claritin, and Flonase by Dr. Pearson, but not taken due to stomach upset  - Cough improved after 40 mg prednisone for 5 days  - Reports sore inside nose and frequent nose blowing    Neurological Care  - Taking Keppra 500 mg twice daily, under neurologist care    Diabetes Testing  - No blood work in the past year  - Requests diabetes testing due to previous borderline status in [place]    Mental Health  - Constant worry about health affecting daily life  - Poor sleep, frequent night waking    Skin Lesion  - Skin lesion previously pulled off  - Appointment with Dr. 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  She  has a past surgical history that includes other abdominal surgery and knee replacement, total.       Objective:   /72   Pulse 83   Temp 36.3 °C (97.3 °F)   Ht 1.626 m (5' 4\")   Wt 72.1 kg (159 lb)   SpO2 94%  Body mass index is 27.29 kg/m².    Physical Exam  Constitutional:       General: She is not in acute distress.     Appearance: She is not ill-appearing or toxic-appearing.   HENT:      Head: Normocephalic.      Right Ear: Tympanic membrane and external ear normal.      Left Ear: Tympanic membrane and external ear normal.      Nose: Nose normal. No rhinorrhea.      Mouth/Throat:      Mouth: Mucous membranes are moist.      Pharynx: Oropharynx is clear. No posterior oropharyngeal erythema.   Eyes:      General:         Right eye: No discharge.         Left eye: No discharge.      Conjunctiva/sclera: Conjunctivae normal.      Pupils: Pupils are equal, round, and reactive to light.   Cardiovascular:      Rate and Rhythm: Normal rate and regular rhythm.      Heart sounds: No murmur heard.  Pulmonary:      Effort: Pulmonary effort is normal. No respiratory distress.      Breath sounds: Normal breath sounds. No wheezing.   Abdominal:      General: Abdomen is flat.   Musculoskeletal:         General: No swelling or tenderness.      Cervical back: Normal range of motion and neck supple.      Right lower leg: No edema.      Left lower leg: No edema.   Skin:     General: Skin is warm.   Neurological:      General: No focal deficit present.      Mental Status: She is alert and oriented to " person, place, and time. Mental status is at baseline.   Psychiatric:         Mood and Affect: Mood normal.          Lab/ Imaging Results:  No labs or imaging to review    Please note that this dictation was created using voice recognition software. I have made every reasonable attempt to correct obvious errors, but I expect that there are errors of grammar and possibly content that I did not discover before finalizing the note.             [1]   Current Outpatient Medications   Medication Sig Dispense Refill    prednisONE (DELTASONE) 2.5 MG Tab Take ONE tablet (2.5 MG total) by MOUTH daily. Take with food 90 Tablet 3    ondansetron (ZOFRAN ODT) 4 MG TABLET DISPERSIBLE Take 1 Tablet by mouth every 6 hours as needed for Nausea/Vomiting. 10 Tablet 2    losartan (COZAAR) 100 MG Tab Take 1 Tablet by mouth at bedtime. 90 Tablet 3    levETIRAcetam (KEPPRA) 500 MG Tab Take 1 Tablet by mouth 2 times a day. 180 Tablet 3    hydroCHLOROthiazide 25 MG Tab Take 1 Tablet by mouth every day. Take 1 tablet (25 mg total) by mouth daily. 90 Tablet 3    ezetimibe (ZETIA) 10 MG Tab Take 1 Tablet by mouth every day. 90 Tablet 3    carvedilol (COREG) 25 MG Tab Take 1 Tablet by mouth 2 times a day. 180 Tablet 2    umeclidinium-vilanterol (ANORO ELLIPTA) 62.5-25 MCG/ACT AEROSOL POWDER, BREATH ACTIVATED inhaler Inhale 1 Puff every day. 60 Each 11    sertraline (ZOLOFT) 25 MG tablet Take 1 Tablet by mouth every day for 14 days, THEN 2 Tablets every day for 14 days. 42 Tablet 0     No current facility-administered medications for this visit.

## 2025-07-16 NOTE — Clinical Note
REFERRAL APPROVAL NOTICE         Sent on July 16, 2025                   Anaiddajuan Aponte  [address] [place] [address]                   Dear Ms. Aponte,    After a careful review of the medical information and benefit coverage, [facility] has processed your referral. See below for additional details.    If applicable, you must be actively enrolled with your insurance for coverage of the authorized service. If you have any questions regarding your coverage, please contact your insurance directly.    REFERRAL INFORMATION   Referral #:  [id number]  Referred-To Provider    Referred-By Provider:  Podiatry    AILEEN Cifuentes AUSTIN A      [address]  [address]  [place] [address]  [phone] [id number]  [place] [address]  [phone]    Referral Start Date:  07/10/2025  Referral End Date:   07/10/2026             SCHEDULING  If you do not already have an appointment, please call [phone] to make an appointment.     MORE INFORMATION  If you do not already have a [employer] account, sign up at: [email or web address]  You can access your medical information, make appointments, see lab results, billing information, and more.  If you have questions regarding this referral, please contact  the [facility] Referrals department at:             [phone]. Monday - Friday 8:00AM - 5:00PM.     Sincerely,    [facility]

## 2025-07-21 ENCOUNTER — HOSPITAL ENCOUNTER (OUTPATIENT)
Dept: LAB | Facility: MEDICAL CENTER | Age: 80
End: 2025-07-21
Attending: FAMILY MEDICINE
Payer: MEDICARE

## 2025-07-21 DIAGNOSIS — E78.5 HYPERLIPIDEMIA, UNSPECIFIED HYPERLIPIDEMIA TYPE: ICD-10-CM

## 2025-07-21 DIAGNOSIS — R73.09 ELEVATED GLUCOSE LEVEL: ICD-10-CM

## 2025-07-21 DIAGNOSIS — R19.7 DIARRHEA, UNSPECIFIED TYPE: ICD-10-CM

## 2025-07-21 DIAGNOSIS — I10 PRIMARY HYPERTENSION: ICD-10-CM

## 2025-07-21 LAB
ALBUMIN SERPL BCP-MCNC: 3.7 G/DL (ref 3.2–4.9)
ALBUMIN/GLOB SERPL: 1.5 G/DL
ALP SERPL-CCNC: 108 U/L (ref 30–99)
ALT SERPL-CCNC: 23 U/L (ref 2–50)
ANION GAP SERPL CALC-SCNC: 16 MMOL/L (ref 7–16)
AST SERPL-CCNC: 27 U/L (ref 12–45)
BASOPHILS # BLD AUTO: 1.2 % (ref 0–1.8)
BASOPHILS # BLD: 0.08 K/UL (ref 0–0.12)
BILIRUB SERPL-MCNC: 0.4 MG/DL (ref 0.1–1.5)
BUN SERPL-MCNC: 10 MG/DL (ref 8–22)
CALCIUM ALBUM COR SERPL-MCNC: 10 MG/DL (ref 8.5–10.5)
CALCIUM SERPL-MCNC: 9.8 MG/DL (ref 8.5–10.5)
CHLORIDE SERPL-SCNC: 102 MMOL/L (ref 96–112)
CHOLEST SERPL-MCNC: 195 MG/DL (ref 100–199)
CO2 SERPL-SCNC: 22 MMOL/L (ref 20–33)
CREAT SERPL-MCNC: 0.74 MG/DL (ref 0.5–1.4)
EOSINOPHIL # BLD AUTO: 0.15 K/UL (ref 0–0.51)
EOSINOPHIL NFR BLD: 2.2 % (ref 0–6.9)
ERYTHROCYTE [DISTWIDTH] IN BLOOD BY AUTOMATED COUNT: 39.8 FL (ref 35.9–50)
EST. AVERAGE GLUCOSE BLD GHB EST-MCNC: 126 MG/DL
GFR SERPLBLD CREATININE-BSD FMLA CKD-EPI: 82 ML/MIN/1.73 M 2
GLOBULIN SER CALC-MCNC: 2.4 G/DL (ref 1.9–3.5)
GLUCOSE SERPL-MCNC: 156 MG/DL (ref 65–99)
HBA1C MFR BLD: 6 % (ref 4–5.6)
HCT VFR BLD AUTO: 43.4 % (ref 37–47)
HDLC SERPL-MCNC: 43 MG/DL
HGB BLD-MCNC: 14.7 G/DL (ref 12–16)
IMM GRANULOCYTES # BLD AUTO: 0.03 K/UL (ref 0–0.11)
IMM GRANULOCYTES NFR BLD AUTO: 0.4 % (ref 0–0.9)
LDLC SERPL CALC-MCNC: 111 MG/DL
LYMPHOCYTES # BLD AUTO: 1.38 K/UL (ref 1–4.8)
LYMPHOCYTES NFR BLD: 20.4 % (ref 22–41)
MCH RBC QN AUTO: 31.8 PG (ref 27–33)
MCHC RBC AUTO-ENTMCNC: 33.9 G/DL (ref 32.2–35.5)
MCV RBC AUTO: 93.9 FL (ref 81.4–97.8)
MONOCYTES # BLD AUTO: 0.58 K/UL (ref 0–0.85)
MONOCYTES NFR BLD AUTO: 8.6 % (ref 0–13.4)
NEUTROPHILS # BLD AUTO: 4.54 K/UL (ref 1.82–7.42)
NEUTROPHILS NFR BLD: 67.2 % (ref 44–72)
NRBC # BLD AUTO: 0 K/UL
NRBC BLD-RTO: 0 /100 WBC (ref 0–0.2)
PLATELET # BLD AUTO: 351 K/UL (ref 164–446)
PMV BLD AUTO: 9.8 FL (ref 9–12.9)
POTASSIUM SERPL-SCNC: 4.1 MMOL/L (ref 3.6–5.5)
PROT SERPL-MCNC: 6.1 G/DL (ref 6–8.2)
RBC # BLD AUTO: 4.62 M/UL (ref 4.2–5.4)
SODIUM SERPL-SCNC: 140 MMOL/L (ref 135–145)
T4 FREE SERPL-MCNC: 1.07 NG/DL (ref 0.93–1.7)
TRIGL SERPL-MCNC: 203 MG/DL (ref 0–149)
TSH SERPL-ACNC: 0.13 UIU/ML (ref 0.38–5.33)
WBC # BLD AUTO: 6.8 K/UL (ref 4.8–10.8)

## 2025-07-21 PROCEDURE — 80061 LIPID PANEL: CPT

## 2025-07-21 PROCEDURE — 83036 HEMOGLOBIN GLYCOSYLATED A1C: CPT

## 2025-07-21 PROCEDURE — 84443 ASSAY THYROID STIM HORMONE: CPT

## 2025-07-21 PROCEDURE — 80053 COMPREHEN METABOLIC PANEL: CPT

## 2025-07-21 PROCEDURE — 36415 COLL VENOUS BLD VENIPUNCTURE: CPT

## 2025-07-21 PROCEDURE — 84439 ASSAY OF FREE THYROXINE: CPT

## 2025-07-21 PROCEDURE — 85025 COMPLETE CBC W/AUTO DIFF WBC: CPT

## 2025-07-22 ENCOUNTER — RESULTS FOLLOW-UP (OUTPATIENT)
Dept: MEDICAL GROUP | Facility: MEDICAL CENTER | Age: 80
End: 2025-07-22
Payer: MEDICARE

## 2025-07-24 ENCOUNTER — APPOINTMENT (OUTPATIENT)
Dept: RADIOLOGY | Facility: MEDICAL CENTER | Age: 80
End: 2025-07-24
Attending: EMERGENCY MEDICINE
Payer: MEDICARE

## 2025-07-24 ENCOUNTER — HOSPITAL ENCOUNTER (EMERGENCY)
Facility: MEDICAL CENTER | Age: 80
End: 2025-07-24
Attending: EMERGENCY MEDICINE
Payer: MEDICARE

## 2025-07-24 VITALS
BODY MASS INDEX: 26.98 KG/M2 | OXYGEN SATURATION: 94 % | TEMPERATURE: 97.5 F | HEIGHT: 64 IN | DIASTOLIC BLOOD PRESSURE: 72 MMHG | RESPIRATION RATE: 20 BRPM | HEART RATE: 107 BPM | SYSTOLIC BLOOD PRESSURE: 168 MMHG | WEIGHT: 158 LBS

## 2025-07-24 DIAGNOSIS — R55 NEAR SYNCOPE: Primary | ICD-10-CM

## 2025-07-24 DIAGNOSIS — S09.90XA CLOSED HEAD INJURY, INITIAL ENCOUNTER: ICD-10-CM

## 2025-07-24 DIAGNOSIS — I10 HYPERTENSION, UNSPECIFIED TYPE: ICD-10-CM

## 2025-07-24 DIAGNOSIS — R11.0 NAUSEA: ICD-10-CM

## 2025-07-24 LAB
ALBUMIN SERPL BCP-MCNC: 3.7 G/DL (ref 3.2–4.9)
ALBUMIN/GLOB SERPL: 1.3 G/DL
ALP SERPL-CCNC: 91 U/L (ref 30–99)
ALT SERPL-CCNC: 25 U/L (ref 2–50)
ANION GAP SERPL CALC-SCNC: 15 MMOL/L (ref 7–16)
AST SERPL-CCNC: 33 U/L (ref 12–45)
BASOPHILS # BLD AUTO: 1.2 % (ref 0–1.8)
BASOPHILS # BLD: 0.1 K/UL (ref 0–0.12)
BILIRUB SERPL-MCNC: 0.8 MG/DL (ref 0.1–1.5)
BUN SERPL-MCNC: 14 MG/DL (ref 8–22)
CALCIUM ALBUM COR SERPL-MCNC: 10.6 MG/DL (ref 8.5–10.5)
CALCIUM SERPL-MCNC: 10.4 MG/DL (ref 8.5–10.5)
CHLORIDE SERPL-SCNC: 97 MMOL/L (ref 96–112)
CO2 SERPL-SCNC: 24 MMOL/L (ref 20–33)
CREAT SERPL-MCNC: 0.88 MG/DL (ref 0.5–1.4)
EKG IMPRESSION: NORMAL
EOSINOPHIL # BLD AUTO: 0.12 K/UL (ref 0–0.51)
EOSINOPHIL NFR BLD: 1.4 % (ref 0–6.9)
ERYTHROCYTE [DISTWIDTH] IN BLOOD BY AUTOMATED COUNT: 38.1 FL (ref 35.9–50)
GFR SERPLBLD CREATININE-BSD FMLA CKD-EPI: 66 ML/MIN/1.73 M 2
GLOBULIN SER CALC-MCNC: 2.8 G/DL (ref 1.9–3.5)
GLUCOSE SERPL-MCNC: 120 MG/DL (ref 65–99)
HCT VFR BLD AUTO: 44.4 % (ref 37–47)
HGB BLD-MCNC: 15.5 G/DL (ref 12–16)
IMM GRANULOCYTES # BLD AUTO: 0.03 K/UL (ref 0–0.11)
IMM GRANULOCYTES NFR BLD AUTO: 0.4 % (ref 0–0.9)
LYMPHOCYTES # BLD AUTO: 1.65 K/UL (ref 1–4.8)
LYMPHOCYTES NFR BLD: 19.9 % (ref 22–41)
MCH RBC QN AUTO: 31.8 PG (ref 27–33)
MCHC RBC AUTO-ENTMCNC: 34.9 G/DL (ref 32.2–35.5)
MCV RBC AUTO: 91 FL (ref 81.4–97.8)
MONOCYTES # BLD AUTO: 0.8 K/UL (ref 0–0.85)
MONOCYTES NFR BLD AUTO: 9.6 % (ref 0–13.4)
NEUTROPHILS # BLD AUTO: 5.6 K/UL (ref 1.82–7.42)
NEUTROPHILS NFR BLD: 67.5 % (ref 44–72)
NRBC # BLD AUTO: 0 K/UL
NRBC BLD-RTO: 0 /100 WBC (ref 0–0.2)
PLATELET # BLD AUTO: 326 K/UL (ref 164–446)
PMV BLD AUTO: 9.2 FL (ref 9–12.9)
POTASSIUM SERPL-SCNC: 3.7 MMOL/L (ref 3.6–5.5)
PROT SERPL-MCNC: 6.5 G/DL (ref 6–8.2)
RBC # BLD AUTO: 4.88 M/UL (ref 4.2–5.4)
SODIUM SERPL-SCNC: 136 MMOL/L (ref 135–145)
TROPONIN T SERPL-MCNC: 26 NG/L (ref 6–19)
TROPONIN T SERPL-MCNC: 27 NG/L (ref 6–19)
WBC # BLD AUTO: 8.3 K/UL (ref 4.8–10.8)

## 2025-07-24 PROCEDURE — 36415 COLL VENOUS BLD VENIPUNCTURE: CPT

## 2025-07-24 PROCEDURE — 700101 HCHG RX REV CODE 250: Performed by: EMERGENCY MEDICINE

## 2025-07-24 PROCEDURE — 700111 HCHG RX REV CODE 636 W/ 250 OVERRIDE (IP): Mod: JZ | Performed by: EMERGENCY MEDICINE

## 2025-07-24 PROCEDURE — 96375 TX/PRO/DX INJ NEW DRUG ADDON: CPT

## 2025-07-24 PROCEDURE — 93005 ELECTROCARDIOGRAM TRACING: CPT | Mod: TC | Performed by: EMERGENCY MEDICINE

## 2025-07-24 PROCEDURE — 71045 X-RAY EXAM CHEST 1 VIEW: CPT

## 2025-07-24 PROCEDURE — 96376 TX/PRO/DX INJ SAME DRUG ADON: CPT

## 2025-07-24 PROCEDURE — 85025 COMPLETE CBC W/AUTO DIFF WBC: CPT

## 2025-07-24 PROCEDURE — 96374 THER/PROPH/DIAG INJ IV PUSH: CPT

## 2025-07-24 PROCEDURE — 84484 ASSAY OF TROPONIN QUANT: CPT

## 2025-07-24 PROCEDURE — 99285 EMERGENCY DEPT VISIT HI MDM: CPT

## 2025-07-24 PROCEDURE — 70450 CT HEAD/BRAIN W/O DYE: CPT

## 2025-07-24 PROCEDURE — 80053 COMPREHEN METABOLIC PANEL: CPT

## 2025-07-24 RX ORDER — LIDOCAINE 4 G/G
1 PATCH TOPICAL ONCE
Status: DISCONTINUED | OUTPATIENT
Start: 2025-07-24 | End: 2025-07-25 | Stop reason: HOSPADM

## 2025-07-24 RX ORDER — SODIUM CHLORIDE 9 MG/ML
1000 INJECTION, SOLUTION INTRAVENOUS ONCE
Status: DISCONTINUED | OUTPATIENT
Start: 2025-07-24 | End: 2025-07-25 | Stop reason: HOSPADM

## 2025-07-24 RX ORDER — HYDRALAZINE HYDROCHLORIDE 20 MG/ML
20 INJECTION INTRAMUSCULAR; INTRAVENOUS ONCE
Status: COMPLETED | OUTPATIENT
Start: 2025-07-24 | End: 2025-07-24

## 2025-07-24 RX ORDER — ONDANSETRON 2 MG/ML
4 INJECTION INTRAMUSCULAR; INTRAVENOUS ONCE
Status: COMPLETED | OUTPATIENT
Start: 2025-07-24 | End: 2025-07-24

## 2025-07-24 RX ADMIN — HYDRALAZINE HYDROCHLORIDE 20 MG: 20 INJECTION, SOLUTION INTRAMUSCULAR; INTRAVENOUS at 18:37

## 2025-07-24 RX ADMIN — LIDOCAINE 1 PATCH: 4 PATCH TOPICAL at 22:36

## 2025-07-24 RX ADMIN — ONDANSETRON 4 MG: 2 INJECTION INTRAMUSCULAR; INTRAVENOUS at 19:56

## 2025-07-24 RX ADMIN — HYDRALAZINE HYDROCHLORIDE 20 MG: 20 INJECTION, SOLUTION INTRAMUSCULAR; INTRAVENOUS at 20:01

## 2025-07-24 ASSESSMENT — PAIN DESCRIPTION - PAIN TYPE: TYPE: ACUTE PAIN

## 2025-07-24 ASSESSMENT — FIBROSIS 4 INDEX: FIB4 SCORE: 1.27

## 2025-07-25 NOTE — ED NOTES
Pt aox4, skin pink warm and dry, airway patent, rr even and unlabored, NAD noted. No new complaints. Pt vss. Pt given discharge instructions without further questions. Pt assisted to wheelchair and to car without new incident or distress.

## 2025-07-25 NOTE — ED NOTES
Pt back from CT, A&O x 4, connected to the monitor. Noted hypertensive with /84. Pt denies CP, SOB, N/V.  New PIVC started on L-forearm, serum samples collected and sent to lab for analysis as ordered by ERP.

## 2025-07-25 NOTE — ED PROVIDER NOTES
ED Provider Note    CHIEF COMPLAINT  Chief Complaint   Patient presents with    T-5000 Head Injury       EXTERNAL RECORDS REVIEWED  7/10/2025, outpatient PCP visit: Diarrhea, hypertension, seizure disorder on Keppra.  Anxiety disorder on Zoloft    LOW/NEFTALY    Anaid Aponte is a 79 y.o. female who presents via EMS for evaluation of persistent lightheadedness.  The patient reports that she took some sort of a fiber supplement today.  Shortly afterwards she was lightheaded and ultimately had a near syncopal episode falling and striking her head.  Headache she did vomit about 20 minutes after this episode.  She has a headache.  The dizziness that she describes as a lightheaded sensation, not a movement sensation, has been persistent all day which ultimately is why EMS was called for the patient to be brought to the hospital.  She is on a daily aspirin, given this and her advanced age the patient was made a code TBI and I evaluate her emergently upon arrival charge desk.  She has no focal weakness numbness or tingling.  Denies new back pain but does have chronic back pain for which she treats with lidocaine patch.  No chest pain or shortness of breath acutely.  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    SURGICAL HISTORY   has a past surgical history that includes other abdominal surgery and knee replacement, total.    FAMILY HISTORY  Family History   Problem Relation Age of Onset    No Known Problems Mother     Heart Disease Father     Hyperlipidemia Father     No Known Problems Brother        SOCIAL HISTORY  Social History     Tobacco Use    Smoking status: Former     Current packs/day: 0.50     Average packs/day: 0.5 packs/day for  "46.0 years (23.0 ttl pk-yrs)     Types: Cigarettes     Start date: 7/29/1979    Smokeless tobacco: Never   Vaping Use    Vaping status: Never Used   Substance and Sexual Activity    Alcohol use: Not Currently    Drug use: Not Currently    Sexual activity: Not Currently       CURRENT MEDICATIONS  Home Medications       Reviewed by Lucero Garcia R.N. (Registered Nurse) on 07/24/25 at 1808  Med List Status: Partial     Medication Last Dose Status   carvedilol (COREG) 25 MG Tab  Active   ezetimibe (ZETIA) 10 MG Tab  Active   hydroCHLOROthiazide 25 MG Tab  Active   levETIRAcetam (KEPPRA) 500 MG Tab  Active   losartan (COZAAR) 100 MG Tab  Active   ondansetron (ZOFRAN ODT) 4 MG TABLET DISPERSIBLE  Active   prednisONE (DELTASONE) 2.5 MG Tab  Active   sertraline (ZOLOFT) 25 MG tablet  Active   umeclidinium-vilanterol (ANORO ELLIPTA) 62.5-25 MCG/ACT AEROSOL POWDER, BREATH ACTIVATED inhaler  Active                  Audit from Redirected Encounters    **Home medications have not yet been reviewed for this encounter**         ALLERGIES  Allergies[1]    PHYSICAL EXAM  VITAL SIGNS: BP (!) 193/84   Pulse 87   Temp 36.4 °C (97.5 °F) (Temporal)   Resp 15   Ht 1.626 m (5' 4\")   Wt 71.7 kg (158 lb)   SpO2 91%   BMI 27.12 kg/m²    Constitutional: Well appearing patient in no acute distress.  Awake and alert, not toxic nor ill in appearance.  HENT: Contusion to the posterior scalp on the left side, no laceration or open wounds appreciated.  Eyes: No scleral icterus. Normal conjunctiva pupils are equal and reactive.  Thorax & Lungs: Normal nonlabored respirations. I appreciate no wheezing, rhonchi or rales. There is normal air movement.  Upon cardiac ascultation I appreciate a regular the irregular rhythm corresponding to frequent PVCs on the monitor.  Normal rate.  Abdomen: The abdomen is not visibly distended. Upon palpation, I find it to be without tenderness.  No mass appreciated.  Skin: The exposed portions of skin reveal " no obvious rash or other abnormalities.  Extremities/Musculoskeletal: No obvious sign of acute trauma. No asymmetric calf tenderness or edema.   Neurologic: Alert & oriented. No focal deficits observed.       EKG/LABS  Results for orders placed or performed during the hospital encounter of 07/24/25   CBC WITH DIFFERENTIAL    Collection Time: 07/24/25  6:15 PM   Result Value Ref Range    WBC 8.3 4.8 - 10.8 K/uL    RBC 4.88 4.20 - 5.40 M/uL    Hemoglobin 15.5 12.0 - 16.0 g/dL    Hematocrit 44.4 37.0 - 47.0 %    MCV 91.0 81.4 - 97.8 fL    MCH 31.8 27.0 - 33.0 pg    MCHC 34.9 32.2 - 35.5 g/dL    RDW 38.1 35.9 - 50.0 fL    Platelet Count 326 164 - 446 K/uL    MPV 9.2 9.0 - 12.9 fL    Neutrophils-Polys 67.50 44.00 - 72.00 %    Lymphocytes 19.90 (L) 22.00 - 41.00 %    Monocytes 9.60 0.00 - 13.40 %    Eosinophils 1.40 0.00 - 6.90 %    Basophils 1.20 0.00 - 1.80 %    Immature Granulocytes 0.40 0.00 - 0.90 %    Nucleated RBC 0.00 0.00 - 0.20 /100 WBC    Neutrophils (Absolute) 5.60 1.82 - 7.42 K/uL    Lymphs (Absolute) 1.65 1.00 - 4.80 K/uL    Monos (Absolute) 0.80 0.00 - 0.85 K/uL    Eos (Absolute) 0.12 0.00 - 0.51 K/uL    Baso (Absolute) 0.10 0.00 - 0.12 K/uL    Immature Granulocytes (abs) 0.03 0.00 - 0.11 K/uL    NRBC (Absolute) 0.00 K/uL   COMP METABOLIC PANEL    Collection Time: 07/24/25  6:15 PM   Result Value Ref Range    Sodium 136 135 - 145 mmol/L    Potassium 3.7 3.6 - 5.5 mmol/L    Chloride 97 96 - 112 mmol/L    Co2 24 20 - 33 mmol/L    Anion Gap 15.0 7.0 - 16.0    Glucose 120 (H) 65 - 99 mg/dL    Bun 14 8 - 22 mg/dL    Creatinine 0.88 0.50 - 1.40 mg/dL    Calcium 10.4 8.5 - 10.5 mg/dL    Correct Calcium 10.6 (H) 8.5 - 10.5 mg/dL    AST(SGOT) 33 12 - 45 U/L    ALT(SGPT) 25 2 - 50 U/L    Alkaline Phosphatase 91 30 - 99 U/L    Total Bilirubin 0.8 0.1 - 1.5 mg/dL    Albumin 3.7 3.2 - 4.9 g/dL    Total Protein 6.5 6.0 - 8.2 g/dL    Globulin 2.8 1.9 - 3.5 g/dL    A-G Ratio 1.3 g/dL   TROPONIN    Collection Time: 07/24/25   6:15 PM   Result Value Ref Range    Troponin T 26 (H) 6 - 19 ng/L   ESTIMATED GFR    Collection Time: 25  6:15 PM   Result Value Ref Range    GFR (CKD-EPI) 66 >60 mL/min/1.73 m 2   TROPONIN    Collection Time: 25  8:30 PM   Result Value Ref Range    Troponin T 27 (H) 6 - 19 ng/L   EKG    Collection Time: 25  9:42 PM   Result Value Ref Range    Report       [facility] Emergency Dept.    Test Date:  2025  Pt Name:    CEE CASTANON                 Department: ER  MRN:        [id number]                      Room:        11  Gender:     Female                       Technician: [id number]  :        1945                   Requested By:MELISSA URRUTIA  Order #:    [id number]                    Reading MD: MELISSA URRUTIA MD    Measurements  Intervals                                Axis  Rate:       71                           P:          121  TN:         109                          QRS:        -41  QRSD:       90                           T:          106  QT:         439  QTc:        478    Interpretive Statements  Sinus rhythm rate of 71, normal TN and QRS interval.  Frequent PACs.  No T  wave inversions.  No ST segment deviation.  My impression: Frequent PACs but  no ischemia  Electronically Signed On 2025 21:42:29 PDT by MELISSA URRUTIA MD        I have independently interpreted this EKG    RADIOLOGY/PROCEDURES   I have independently interpreted the diagnostic imaging associated with this visit and am waiting the final reading from the radiologist.   My preliminary interpretation is as follows: No ICH    Radiologist interpretation:  DX-CHEST-PORTABLE (1 VIEW)   Final Result      1.  Low lung volumes.   2.  Interstitial infiltrates and/or edema.   3.  Small left pleural effusion with adjacent airspace disease.      CT-HEAD W/O   Final Result      1.  Diffuse atrophy and white matter changes.   2.  No acute intracranial hemorrhage or territorial infarct.                       COURSE & MEDICAL DECISION MAKING    ASSESSMENT, COURSE AND PLAN  Care Narrative: This is a 79-year-old female, code TBI after a ground-level fall this morning, she struck her head.  This was secondary to lightheadedness which has been persistent all day today.  Presents with persistent headache albeit rather mild, some persistent lightheadedness as well but an otherwise unremarkable examination with no focal neurological plaints or findings appreciated on exam.  Initial vital signs are significant for hypertension.  Frequent PACs on her EKG with otherwise no ischemic changes.  She will go right to CT for imaging of her head.  Will administer IV fluids.  Blood work will be obtained and ultimately she will be reevaluated      Blood work is quite reassuring other than a nonspecific bump in her troponin.  GFR, LFTs, electrolytes, blood counts are all within normal limits.  Repeated 2 hours later there is no significant increase in her troponin.  The patient's blood pressure did spike here, she got some hydralazine with improvement of her blood pressure.  She received some IV fluids.  She reports overall she is feeling significantly improved.  The patient does express concerns about being discharged from the hospital as she is concerned that as she feels gassy concerned about having to have her daughter pick her up as her daughter recently went through cancer treatments.  She is also concerned that at some point tonight her blood pressure might spike again although currently it is 135/65.  Heart rate is 98.  I explained to the patient that I really at this point is not an indication to keep her in the hospital.  She reports that she is starting to have low back pain as her lidocaine patch is beginning to wear off.  I have offered her a new lidocaine patch.  At this point she is discharged home in stable condition, strict return instruction provided, she will follow-up with her primary care provider.          FINAL DIAGNOSIS  1. Near syncope    2. Closed head injury, initial encounter    3. Hypertension, unspecified type    4. Nausea         Electronically signed by: Giovanni Miller M.D., 7/24/2025 6:06 PM           [1]   Allergies  Allergen Reactions    Penicillins

## 2025-07-25 NOTE — ED NOTES
Pt's BP improved after 10 mg Hydralazine IV given, now 172/72. Pt made aware of plan of care.  Call light within reach. Family member at bedside.

## 2025-07-25 NOTE — ED TRIAGE NOTES
Pt bib ems from home c/o dizzines this am with glf striking her head. Pt denies loc pt does take daily 81mg asa. Gcs 15 fsbs 144. Nad. Pt c/o HA. Pt to ct with rn after ERP assessment at charge desk. Pt then to R11 and report off to Francia Waddell

## 2025-07-28 ENCOUNTER — APPOINTMENT (OUTPATIENT)
Dept: RADIOLOGY | Facility: MEDICAL CENTER | Age: 80
DRG: 312 | End: 2025-07-28
Attending: EMERGENCY MEDICINE
Payer: MEDICARE

## 2025-07-28 ENCOUNTER — APPOINTMENT (OUTPATIENT)
Dept: CARDIOLOGY | Facility: MEDICAL CENTER | Age: 80
DRG: 312 | End: 2025-07-28
Payer: MEDICARE

## 2025-07-28 ENCOUNTER — HOSPITAL ENCOUNTER (INPATIENT)
Facility: MEDICAL CENTER | Age: 80
LOS: 3 days | End: 2025-07-31
Attending: EMERGENCY MEDICINE | Admitting: STUDENT IN AN ORGANIZED HEALTH CARE EDUCATION/TRAINING PROGRAM
Payer: MEDICARE

## 2025-07-28 PROBLEM — R55 SYNCOPE AND COLLAPSE: Status: ACTIVE | Noted: 2025-07-28

## 2025-07-28 PROBLEM — E87.6 HYPOKALEMIA: Status: ACTIVE | Noted: 2025-07-28

## 2025-07-28 PROCEDURE — 93306 TTE W/DOPPLER COMPLETE: CPT

## 2025-07-28 PROCEDURE — 93306 TTE W/DOPPLER COMPLETE: CPT | Mod: 26 | Performed by: INTERNAL MEDICINE

## 2025-07-28 PROCEDURE — 71045 X-RAY EXAM CHEST 1 VIEW: CPT

## 2025-07-28 ASSESSMENT — FIBROSIS 4 INDEX
FIB4 SCORE: 1.6
FIB4 SCORE: 2.77

## 2025-07-28 ASSESSMENT — COGNITIVE AND FUNCTIONAL STATUS - GENERAL
SUGGESTED CMS G CODE MODIFIER DAILY ACTIVITY: CK
MOVING TO AND FROM BED TO CHAIR: A LITTLE
EATING MEALS: A LITTLE
TOILETING: A LOT
DAILY ACTIVITIY SCORE: 15
WALKING IN HOSPITAL ROOM: A LOT
DRESSING REGULAR UPPER BODY CLOTHING: A LITTLE
DRESSING REGULAR LOWER BODY CLOTHING: A LOT
STANDING UP FROM CHAIR USING ARMS: A LITTLE
SUGGESTED CMS G CODE MODIFIER MOBILITY: CK
MOVING FROM LYING ON BACK TO SITTING ON SIDE OF FLAT BED: A LITTLE
CLIMB 3 TO 5 STEPS WITH RAILING: A LOT
PERSONAL GROOMING: A LITTLE
HELP NEEDED FOR BATHING: A LOT
MOBILITY SCORE: 16
TURNING FROM BACK TO SIDE WHILE IN FLAT BAD: A LITTLE

## 2025-07-28 ASSESSMENT — LIFESTYLE VARIABLES
EVER FELT BAD OR GUILTY ABOUT YOUR DRINKING: NO
HAVE YOU EVER FELT YOU SHOULD CUT DOWN ON YOUR DRINKING: NO
TOTAL SCORE: 0
HOW MANY TIMES IN THE PAST YEAR HAVE YOU HAD 5 OR MORE DRINKS IN A DAY: 0
ALCOHOL_USE: NO
EVER HAD A DRINK FIRST THING IN THE MORNING TO STEADY YOUR NERVES TO GET RID OF A HANGOVER: NO
CONSUMPTION TOTAL: INCOMPLETE
HAVE PEOPLE ANNOYED YOU BY CRITICIZING YOUR DRINKING: NO
ON A TYPICAL DAY WHEN YOU DRINK ALCOHOL HOW MANY DRINKS DO YOU HAVE: 0
AVERAGE NUMBER OF DAYS PER WEEK YOU HAVE A DRINK CONTAINING ALCOHOL: 0
TOTAL SCORE: 0
CONSUMPTION TOTAL: INCOMPLETE
TOTAL SCORE: 0
DOES PATIENT WANT TO STOP DRINKING: NO
DOES PATIENT WANT TO STOP DRINKING: NO

## 2025-07-28 ASSESSMENT — PATIENT HEALTH QUESTIONNAIRE - PHQ9
SUM OF ALL RESPONSES TO PHQ9 QUESTIONS 1 AND 2: 0
2. FEELING DOWN, DEPRESSED, IRRITABLE, OR HOPELESS: NOT AT ALL
1. LITTLE INTEREST OR PLEASURE IN DOING THINGS: NOT AT ALL

## 2025-07-28 ASSESSMENT — ENCOUNTER SYMPTOMS
HEMOPTYSIS: 0
WHEEZING: 0
FEVER: 0
HEMATOCHEZIA: 0
COUGH: 0
DIAPHORESIS: 0

## 2025-07-28 ASSESSMENT — PAIN DESCRIPTION - PAIN TYPE
TYPE: ACUTE PAIN
TYPE: ACUTE PAIN

## 2025-07-28 NOTE — ASSESSMENT & PLAN NOTE
Patient with atypical presentation of syncope, with prodromal symptoms of lightheadedness, nausea, fatigue, and also reportedly cyanotic lips during event. Has history of seizures occurring 3 years ago and has been stable on Keppra without breakthrough seizures since then. CBC and CMP overall wnl other than hypokalemia. Concern for cardiogenic syncope vs orthostatic hypotension vs possible vasovagal vs lower concern for seizure episode.    - Cardiology consulted in ED, appreciate recommendations:  Orthostatic vital signs were abnormal. Echocardiogram showed mild LVH, EF 65%, mild mitral regurgitation. Troponin decreased from 267-227.   -NM stress test 7/29 negative  -Recommend rechecking her blood pressure in 10-15 minutes if her BP spikes occurr during rest and she is asymptomatic.  -Recommend hydralazine 10 mg p.o. as needed for persistent hypertension episodes over  with rest  - Decrease Coreg further to 3.125 mg twice daily due to bradycardia   -Continue ASA 81 mg, home BP meds including losartan and HCTZ. Continue Ezetimibe.   -Atorvastatin 40mg daily started in-patient.  -vEEG negative for acute seizure-like activity  -Continue home Keppra  -MRI Brain negative for acute intracranial abnormality

## 2025-07-28 NOTE — PROCEDURES
INPATIENT ROUTINE VIDEO ELECTROENCEPHALOGRAM REPORT    REFERRING PROVIDER: Jose Ragsdale M.d.  DOS: 7/28/2025  STUDY DURATION: 0 hours and 32 minutes of total recording time.     INDICATION:  Anaid Aponte 79 y.o. female presenting with syncope    RELEVANT MEDICATIONS/TREATMENTS:   Scheduled Medications[1]    TECHNIQUE:   Routine VEEG was set up by a Neurodiagnostic technologist who performed education to the patient and staff. A minimum of 23 electrodes and 23 channel recording was setup and performed by Neurodiagnostic technologist, in accordance with the international 10-20 system. The study was reviewed in bipolar and referential montages. The recording examined the patient in the  awake state(s).     DESCRIPTION OF THE RECORD:  During wakefulness, the background was continuous and showed a 9 Hz posterior dominant rhythm. Abundant myogenic artifact was present during the study. There was reactivity to eye closure/opening.  An anterior-posterior gradient was noted with faster beta frequencies seen anteriorly.       EEG Sleep: N2 sleep architecture was not seen.    ICTAL AND INTERICTAL FINDINGS:   No focal or generalized epileptiform activity noted.     No regional slowing or persistent focal asymmetries were seen.    No seizures.     ACTIVATION PROCEDURES:   Intermittent Photic stimulation was performed in a stepwise fashion from 1 to 30 Hz and did not elicit any abnormalities on EEG.     EKG: Single lead EKG was irregular.     EVENTS:  None    INTERPRETATION:   Abnormal video EEG recording in the awake state(s):  - No regional slowing or persistent focal asymmetries were seen.  - No epileptiform discharges were seen.  - No seizures.   - Single lead EKG was irregular. Please note that EKG lead on EEG tracing is insufficient to diagnose underlying cardiac abnormalities, which would require dedicated cardiac testing.         Bonnie Mattson MD  Department of Neurology at [facility]  General Neurologist and Epileptologist   of Clinical Neurology, [facility].          [1]   Scheduled Medications   Medication Dose Frequency    [Held by provider] heparin  5,000 Units Q8HRS    senna-docusate  2 Tablet Q EVENING    [START ON 7/29/2025] aspirin  81 mg DAILY    carvedilol  6.25 mg BID    [START ON 7/29/2025] ezetimibe  10 mg DAILY    [START ON 7/29/2025] lidocaine  1 Patch DAILY    levETIRAcetam  500 mg BID    losartan  100 mg QHS    [START ON 7/29/2025] umeclidinium-vilanterol  1 Puff DAILY    [START ON 7/29/2025] prednisONE  2.5 mg DAILY    [START ON 7/29/2025] hydroCHLOROthiazide  25 mg DAILY    atorvastatin  40 mg Q EVENING

## 2025-07-28 NOTE — H&P
FAMILY MEDICINE HISTORY AND PHYSICAL       PATIENT ID:  NAME:  Anaid Aponte  MRN:               [id number]  YOB: 1945    Date of Admission: 7/28/2025     Attending: Jose Ragsdale M.d.     Resident: Magaly Dugan M.D.    Primary Care Physician:  AILEEN Cifuentes    CC:    Chief Complaint   Patient presents with    Syncope     HPI: Anaid Aponte is a 79 y.o. female w/PMHx HTN, COPD, Hx seizure 3 years ago on Keppra, who presented 7/28/25 with syncopal event. Patient lives with her daughter, who is at bedside assisting with history. This morning, patient was feeling more fatigued than usual. Requested a shower. After her shower, needed to have a bowel movement. While she was on the toilet, she lost consciousness. Daughter notes she was coming in and out of consciousness for about 7-8min. Was having lightheadedness, nausea/vomiting ever since 7/24/25. Patient came to the ED on 7/24/25 for syncopal fall with head strike with similar symptoms of lightheadedness, nausea/vomiting prior to event. Was on the ground for 45min at the time. Has had other falls in the past, once in 3/2025 where she sustained broken ribs. Patient stated this was a mechanical fall due to tripping on a threshold at her home. Hx of seizure 3 years ago when she was living in [place], suspected etiology per her neurologists was for encephalitis. Has been on Keppra 500mg BID since then. No other known seizure history. Has appointment on 9/07/25 with neurology to establish care. No recent medication changes. Denies any headaches, dizziness/vertigo, vision changes, memory loss, chest pain. Reports SOB this morning. Hx of COPD. Has home albuterol rescue inhaler, but didn't use. Also on Anoro Ellipta daily. No home O2 requirement.     ERCourse:  On arrival to the ED, patient bradycardic in the 48-54 range.  Labs significant for elevated troponin at 267 and BNP of 495, hypokalemia of 3.5. CXR showed bibasilar atelectasis vs  "infiltrate with possible small effusions. EKG shows sinus bradycardia with multiple PACs, no ST elevation, mild ST depressions in lateral leads. Cardiology consulted from ED with recommendation for admission, trending troponin, and echocardiogram.     REVIEW OF SYSTEMS:   Ten systems reviewed and were negative except as noted in the HPI.                PAST MEDICAL HISTORY:   has a past medical history of COPD (chronic obstructive pulmonary disease) ([facility]) (07/29/2024), Diverticulitis, Encephalitis (07/29/2024), Hyperlipidemia (07/29/2024), Polyarthritis rheumatica (HCC) (07/29/2024), Primary hypertension (07/29/2024), and Thyroid nodule.     PAST SURGICAL HISTORY:   has a past surgical history that includes other abdominal surgery and knee replacement, total.     Colectomy    FAMILY HISTORY:  family history includes Heart Disease in her father; Hyperlipidemia in her father; No Known Problems in her brother and mother.     SOCIAL HISTORY:   Living Situation: Lives in [place] with daughter and daughter's . Daughter is primary caretaker and manages all her medications.   Smoking: Quit 30 years ago, smoked for 25 years.   Etoh: None  Recreational Drug: None    DIET:   Orders Placed This Encounter   Procedures    Diet Order Diet: Cardiac     Standing Status:   Standing     Number of Occurrences:   1     Diet::   Cardiac [6]    Diet NPO Restrict to: Sips with Medications     Standing Status:   Standing     Number of Occurrences:   8     Diet NPO Restrict to::   Sips with Medications [3]     ALLERGIES:  Allergies[1]    OUTPATIENT MEDICATIONS:  Medications - Current, Listed Continuously[2]    PHYSICAL EXAM:  Vitals:    07/28/25 1400 07/28/25 1502 07/28/25 1536 07/28/25 1542   BP:  (!) 158/71  (!) 165/62   Pulse: (!) 50 (!) 52 (!) 57 (!) 46   Resp:  18 16    Temp:   36.2 °C (97.2 °F)    TempSrc:   Temporal    SpO2: 99% 99% 92%    Weight:   72.8 kg (160 lb 7.9 oz)    Height:   1.626 m (5' 4\")    , Temp (24hrs), " Av.1 °C (97 °F), Min:36 °C (96.8 °F), Max:36.2 °C (97.2 °F)  , Pulse Oximetry: 92 %, O2 (LPM): 0, O2 Delivery Device: None - Room Air    Physical Exam  Constitutional:       General: She is not in acute distress.     Appearance: Normal appearance.   HENT:      Head: Normocephalic and atraumatic.      Mouth/Throat:      Mouth: Mucous membranes are dry.      Pharynx: Oropharynx is clear. No oropharyngeal exudate or posterior oropharyngeal erythema.   Eyes:      Extraocular Movements: Extraocular movements intact.      Conjunctiva/sclera: Conjunctivae normal.      Pupils: Pupils are equal, round, and reactive to light.   Cardiovascular:      Rate and Rhythm: Regular rhythm. Bradycardia present.      Pulses: Normal pulses.      Heart sounds: Normal heart sounds.   Pulmonary:      Effort: Pulmonary effort is normal.      Comments: Faint expiratory wheezing noted bilaterally over middle and lower lung fields  Abdominal:      General: Abdomen is flat. Bowel sounds are normal. There is no distension.      Palpations: Abdomen is soft.      Tenderness: There is no abdominal tenderness.   Musculoskeletal:      Right lower leg: No edema.      Left lower leg: No edema.   Skin:     General: Skin is warm and dry.   Neurological:      General: No focal deficit present.      Mental Status: She is alert and oriented to person, place, and time.       LAB TESTS:   Recent Labs     25  1115   WBC 7.4   RBC 4.05*   HEMOGLOBIN 12.9   HEMATOCRIT 39.3   MCV 97.0   MCH 31.9   MCHC 32.8   RDW 40.8   PLATELETCT 268   MPV 9.7     Recent Labs     25  1115   SODIUM 136   POTASSIUM 3.5*   CHLORIDE 100   CO2 23   GLUCOSE 137*   BUN 29*   CREATININE 1.22   CALCIUM 9.2     Recent Labs     25  1115   ALTSGPT 22   ASTSGOT 44   ALKPHOSPHAT 62   TBILIRUBIN 0.6   GLUCOSE 137*         Recent Labs     25  1115   NTPROBNP 495*         Recent Labs     25  1115 25  1356   TROPONINT 267* 225*     CULTURES:   Results        ** No results found for the last 168 hours. **          IMAGES:  DX-CHEST-PORTABLE (1 VIEW)   Final Result      Bibasilar atelectasis versus infiltrate with possible small effusions.      EC-ECHOCARDIOGRAM COMPLETE W/O CONT    (Results Pending)     CONSULTS:   Cardiology    ASSESSMENT/PLAN:   79 y.o. female w/PMHx HTN, COPD, Hx seizure 3 years ago on Keppra, admitted for syncopal workup with elevated troponin and BNP.     I anticipate this patient will require at least two midnights for appropriate medical management, necessitating inpatient admission because workup of syncope with possible cardiogenic etiology.    Patient will need a Telemetry bed secondary to syncope with possibly cardiogenic etiology.    * Syncope and collapse- (present on admission)  Assessment & Plan  Patient with atypical presentation of syncope, with prodromal symptoms of lightheadedness, nausea, fatigue, and also reportedly cyanotic lips during event. Has history of seizures occurring 3 years ago and has been stable on Keppra without breakthrough seizures since then. CBC and CMP overall wnl other than hypokalemia. Concern for possible vasovagal vs cardiogenic syncope vs lower concern for seizure episode.   - Cardiology consulted in ED, appreciate recommendations:   -Lower carvedilol dose to 6.25 mg p.o. twice daily hold for heart rate less than 55   -Check orthostatics  -Ordered echocardiogram  -Troponin decreased from 267-225.  Trend troponin.  Plan for initiating heparin gtt if greater than 400  -Continue ASA 81 mg  -Continue home BP meds including losartan and HCTZ  -Continue Ezetimibe. Begin Statin. Will start with Atorvastatin 40mg daily.   -Discussed workup including stress test vs cardiac catheterization. Pending results of troponin trend.   -Ordered vEEG to r/o seizure  -Continue home Keppra    Hypokalemia  Assessment & Plan  Hypokalemia of 3.5 on admission. Repleted with 40mEq PO Kdur. Will re-check BMP in AM.     Convulsions,  unspecified convulsion type (HCC)- (present on admission)  Assessment & Plan  Patient's daughter reports history of convulsive seizures 3 years ago when patient lived in [place]. 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 Started on Keppra 500mg BID at the time, has continued on same dosage since then without breakthrough seizures. Currently still follows remotely with her neurologist from [place] with plans to establish with neurology here in [place] in 9/2025.   - Continue home Keppra  - Given prior history of seizures, will order vEEG at this time    Thyroid nodule- (present on admission)  Assessment & Plan  7/21/25 TSH decreased at 0.131, T4 wnl at 1.07. Euthyroid pattern. Will continue to monitor.     Hyperlipidemia- (present on admission)  Assessment & Plan  Most recent lipid panel from 7/21/25 showed LDL cholesterol 111, HDL 43, Tgs 203, total cholesterol 193.   - Continue home Zetia  - Per cardiology recommendations, will add on statin at this time with atorvastatin 40mg daily    Polyarthritis rheumatica (HCC)- (present on admission)  Assessment & Plan  -Continue home prednisone 2.5mg daily. As patient is overall stable and able to take oral medications, will continue current dosage without stress dosing. If patient appears to clinically worsen or is unable to take oral medications then will have low threshold for stress dose steroids with prednisone 10mg daily.     Primary hypertension- (present on admission)  Assessment & Plan  -Continue home HCTZ and Losartan    COPD (chronic obstructive pulmonary disease) (HCC)- (present on admission)  Assessment & Plan  Currently stable on room air. No home O2 requirement. Has home rescue albuterol which she has not needed to use recently as well as daily Anoro Ellipta inhaler. Was noted to have some mild expiratory wheezing bilaterally on exam but otherwise normal effort.   - RT consult, continuous pulse ox  - Continue home Anoro  "Ellipta  - Prn albuterol       Core Measures:  Fluids: PO intake, NPO @ MN for possible procedure  Lines: PIV  Abx: None  Diet: Cardiac diet, then NPO at MN  PPX: SCDs/TEDs and heparin ppx, holding heparin @MN for possible procedure    CODE Status: Full Code    Magaly Dugan M.D.  PGY-3  [facility]         [1]   Allergies  Allergen Reactions    Codeine Vomiting and Nausea          Penicillins Vomiting and Nausea    Skin Adhesives Rash     \"Tears skin apart\"   [2]   Current Facility-Administered Medications:     Respiratory Therapy Consult, , Nebulization, Continuous RT, Magaly Dugan M.D.    [Held by provider] heparin injection 5,000 Units, 5,000 Units, Subcutaneous, Q8HRS, Magaly Dugan M.D.    acetaminophen (Tylenol) tablet 650 mg, 650 mg, Oral, Q6HRS PRN, Magaly Dugan M.D.    senna-docusate (Pericolace Or Senokot S) 8.6-50 MG per tablet 2 Tablet, 2 Tablet, Oral, Q EVENING **AND** polyethylene glycol/lytes (Miralax) Packet 1 Packet, 1 Packet, Oral, QDAY PRN, aMgaly Dugan M.D.    [START ON 7/29/2025] aspirin EC tablet 81 mg, 81 mg, Oral, DAILY, Magaly Dugan M.D.    carvedilol (Coreg) tablet 6.25 mg, 6.25 mg, Oral, BID, Magaly Dugan M.D.    [START ON 7/29/2025] ezetimibe (Zetia) tablet 10 mg, 10 mg, Oral, DAILY, Magaly Dugan M.D.    [START ON 7/29/2025] lidocaine (Asperflex) 4 % 1 Patch, 1 Patch, Transdermal, DAILY, Magaly Dugan M.D.    levETIRAcetam (Keppra) tablet 500 mg, 500 mg, Oral, BID, Magaly Dugan M.D.    losartan (Cozaar) tablet 100 mg, 100 mg, Oral, QHS, Magaly Dugan M.D.    [START ON 7/29/2025] umeclidinium-vilanterol (Anoro Ellipta) inhaler 1 Puff, 1 Puff, Inhalation, DAILY, Magaly Dugan M.D.    ondansetron (Zofran ODT) dispertab 4 mg, 4 mg, Oral, Q6HRS PRN, Magaly Dugan M.D.    [START ON 7/29/2025] predniSONE (Deltasone) tablet 2.5 mg, 2.5 mg, Oral, DAILY, Magaly Dugan M.D.    [START ON 7/29/2025] hydroCHLOROthiazide tablet 25 mg, " 25 mg, Oral, DAILY, Magaly Dugan M.D.    albuterol (Proventil) 2.5mg/0.5ml nebulizer solution 2.5 mg, 2.5 mg, Nebulization, Q4H PRN (RT), Magaly Dugan M.D.    atorvastatin (Lipitor) tablet 40 mg, 40 mg, Oral, Q EVENING, Magaly Dugan M.D.    potassium chloride SA (Kdur) tablet 40 mEq, 40 mEq, Oral, Once, Magaly Dugan M.D.

## 2025-07-28 NOTE — ASSESSMENT & PLAN NOTE
Patient's daughter reports history of convulsive seizures 3 years ago when patient lived in [place]. 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 Started on Keppra 500mg BID at the time, has continued on same dosage since then without breakthrough seizures. Currently still follows remotely with her neurologist from [place] with plans to establish with neurology here in [place] in 9/2025.   - Continue home Keppra  - EGG was normal.  - Brain MRI showed no acute abnormalities

## 2025-07-28 NOTE — ASSESSMENT & PLAN NOTE
Hypokalemia of 3.5 on admission. Repleted with 40mEq PO Kdur. Repeat this morning 3.2, gave additional 40mEq PO. Mg+ 1.7.   - Re-check BMP in AM

## 2025-07-28 NOTE — ED TRIAGE NOTES
Pt BIB [facility] with c/c of a syncopal event on the toilet. Pt was having a BM and being assisted by her daughter. Upon EMS arrival pt was found to be hypotensive and bradycardiac. Pt given 800ml of NS by EMS.

## 2025-07-28 NOTE — PROGRESS NOTES
4 Eyes Skin Assessment Completed by TAWANDA Hare and TAWANDA Hargrove.    Skin assessment is primarily focused on high risk bony prominences. Pay special attention to skin beneath and around medical devices, high risk bony prominences, skin to skin areas and areas where the patient lacks sensation to feel pain and areas where the patient previously had breakdown.     Head (Occipital):  WDL   Ears (Under Medical Devices): WDL   Nose (Under Medical Devices): WDL   Mouth:  WDL   Neck: WDL   Breast/Chest:  WDL   Shoulder Blades:  WDL   Spine:   WDL   (R) Arm/Elbow/Hand: Red and Bruising   (L) Arm/Elbow/Hand: Red and Bruising   Abdomen: WDL   Pannus/Groin:  WDL   Sacrum/Coccyx:   Red, Blanching, and Moisture   (R) Ischial Tuberosity (Sit Bones):  Red and Blanching   (L) Ischial Tuberosity (Sit Bones):  Red and Blanching   (R) Leg:  Red, Blanching, and Bruising   (L) Leg:  Red, Blanching, and Bruising   (R) Heel:  Red and Blanching   (R) Foot/Toe: Red and Blanching   (L) Heel: Red and Blanching   (L) Foot/Toe:  Red and Blanching       DEVICES IN USE:   Respiratory Devices:  NA, patient on room air  Feeding Devices:  N/A   Lines & BP Monitoring Devices:  Peripheral IV, BP cuff, and Pulse ox    Orthopedic Devices:  N/A  Miscellaneous Devices:  Purewick    PROTOCOL INTERVENTIONS:   Low Airloss Bed:  Reinforced/reapplied    WOUND PHOTOS:   Completed and in EPIC     WOUND CONSULT:   N/A, no advanced wound care needs identified

## 2025-07-28 NOTE — CONSULTS
Cardiology Initial Consult Note    Date of note:    7/28/2025      Consulting Physician: Jose Ragsdale M.D.    Name:   Anaid Aponte   YOB: 1945  Age:   79 y.o.  female   MRN:   [id number]    Assessment/Recommendations:   Syncope x 2 first one with injury.  She had another reported mechanical fall in April, but daughter not sure where she did injure her ribs.  She is also weak.  Differential diagnosis would be bradycardia vs orthostatic hypotension vs neuro.  Coreg only used for hypertension.  Lower carvedilol dose to 6.25 mg po bid and hold for HR < 55 bpm.  Tele monitor  Check orthostatics  Echo echo  Trop 245.  Syncope is unusual presentation.  Discussed with daughter workup of stress test vs cath depending on trop.  The risks, benefits, and alternatives to coronary angiography with possible percutaneous coronary intervention and IV sedation were discussed in great detail. Specific risks mentioned include bleeding that may require blood transfusion, kidney damage from IV contrast allergy, infection, allergic reaction, arterial damage that may require intervention or surgery, cardiac perforation with possible tamponade requiring kiana-cardiocentesis or possible open heart surgery. We also discussed in great detail a stent is placed, they will have to be on two antiplatelet agents (blood thinners) for up to 1 year.  Verified with patient no planned surgeries/procedures are planned within the next year.  Verified with patient no recent bleeding.  In addition, we discussed that 10% of patients will experience small to moderate bruising at the side of the arterial puncture. Lastly the risks of heart attack, stroke, and death were discussed; the risks of major complications such as heart attack or stroke caused by the angiogram is less than 1%; the risk of death is approximately 1 in 1000. Will see how trop trend and echo results.  Trend trop  Hold heparin gtt unless trop >400 on next one  ECASA 81 mg  po qd  Continue with losartan etc she has home meds  statin    Disposition: 1 day pending testing    Reason for Consultation: syncope and elevated troponin    HPI:  Anaid Aponte is a 79 y.o. female hypertension on losartan, HCTZ, carvedilol, dyslipidemia, on zetia, history of seizure on keppra, presented to ED with syncope while on toilet having bowel movement.  Initial trop 267.  Per patient, started feeling dizzy about 7/24.  She was getting up on 7/24 to the bathroom, and then went to the kitchen when she fell backwards and hit her head.  Daughter and  were out so not sure how long she was down.  She was very weak and not able to get up.  Today, her daughter helped her to the shower.  She states felt dizzy.  She then had to go tot he bathroom and her daughter helped her there but she started to slip on the toilet.  Lips turned blue and daughter called 911.  She denies chest pain or shortness of breath.  She denies recent illness.      Patient was seen in ER 7/24/2025 for syncope. CT was negative for bleed on 7/24/25    Cardiac Imaging and Procedures Review (personally reviewed and notable for):    EKG dated 7/28/25:  sinus, PACs, 52 bpm, nonspecific ST and T changes, similar to prior ECG of 7/24/25    Radiology test Review:  DX-CHEST-PORTABLE (1 VIEW)   Final Result      Bibasilar atelectasis versus infiltrate with possible small effusions.          Physical Exam  Body mass index is 27.12 kg/m².  /70   Pulse (!) 48   Temp 36 °C (96.8 °F) (Temporal)   Resp 15   Wt 71.7 kg (158 lb)   SpO2 92%   Vitals:    07/28/25 1108 07/28/25 1113   BP: 134/70    Pulse: (!) 48    Resp: 15    Temp: 36 °C (96.8 °F)    TempSrc: Temporal    SpO2: 92%    Weight:  71.7 kg (158 lb)     Oxygen Therapy:  Pulse Oximetry: 92 %, O2 Delivery Device: None - Room Air    Physical Exam  Constitutional:       Appearance: Normal appearance.      Comments: Hard of hearing, daughter at bedside   Eyes:      Extraocular Movements:  Extraocular movements intact.      Conjunctiva/sclera: Conjunctivae normal.   Neck:      Vascular: No carotid bruit or JVD.   Cardiovascular:      Rate and Rhythm: Normal rate and regular rhythm.      Pulses:           Radial pulses are 2+ on the right side and 2+ on the left side.        Posterior tibial pulses are 1+ on the right side and 1+ on the left side.      Heart sounds: Normal heart sounds. No murmur heard.     No friction rub. No gallop.   Pulmonary:      Effort: Pulmonary effort is normal. No respiratory distress.      Breath sounds: Normal breath sounds. No wheezing.   Abdominal:      General: Bowel sounds are normal.      Palpations: Abdomen is soft.   Musculoskeletal:      Cervical back: Neck supple.      Right lower leg: No edema.      Left lower leg: No edema.   Skin:     General: Skin is warm and dry.   Neurological:      Mental Status: She is alert and oriented to person, place, and time. Mental status is at baseline.   Psychiatric:         Mood and Affect: Mood normal.          Problem list:  Principal Problem:    Syncope and collapse (POA: Yes)  Resolved Problems:    * No resolved hospital problems. *      Past Medical History[1]    Past Surgical History[2]    Prescriptions Prior to Admission[3]  Current Medications[4]    Allergies[5]    Family History   Problem Relation Age of Onset    No Known Problems Mother     Heart Disease Father     Hyperlipidemia Father     No Known Problems Brother        Social History     Socioeconomic History    Marital status:      Spouse name: Not on file    Number of children: Not on file    Years of education: Not on file    Highest education level: Not on file   Occupational History    Not on file   Tobacco Use    Smoking status: Former     Current packs/day: 0.50     Average packs/day: 0.5 packs/day for 46.0 years (23.0 ttl pk-yrs)     Types: Cigarettes     Start date: 7/29/1979    Smokeless tobacco: Never   Vaping Use    Vaping status: Never Used    Substance and Sexual Activity    Alcohol use: Not Currently    Drug use: Not Currently    Sexual activity: Not Currently   Other Topics Concern    Not on file   Social History Narrative    Not on file     Social Drivers of Health     Financial Resource Strain: Not on file   Food Insecurity: Not on file   Transportation Needs: Not on file   Physical Activity: Not on file   Stress: Not on file   Social Connections: Not on file   Intimate Partner Violence: Not on file   Housing Stability: Not on file       No intake or output data in the 24 hours ending 07/28/25 1332      Review of Systems   Constitutional: Negative for diaphoresis and fever.   Cardiovascular:  Negative for chest pain.   Respiratory:  Negative for cough, hemoptysis and wheezing.    Gastrointestinal:  Negative for hematochezia and melena.   Genitourinary:  Negative for hematuria.        Labs (personally reviewed and notable for):   Lab Results   Component Value Date/Time    SODIUM 136 07/28/2025 11:15 AM    POTASSIUM 3.5 (L) 07/28/2025 11:15 AM    CHLORIDE 100 07/28/2025 11:15 AM    CO2 23 07/28/2025 11:15 AM    GLUCOSE 137 (H) 07/28/2025 11:15 AM    BUN 29 (H) 07/28/2025 11:15 AM    CREATININE 1.22 07/28/2025 11:15 AM      Lab Results   Component Value Date/Time    WBC 7.4 07/28/2025 11:15 AM    RBC 4.05 (L) 07/28/2025 11:15 AM    HEMOGLOBIN 12.9 07/28/2025 11:15 AM    HEMATOCRIT 39.3 07/28/2025 11:15 AM    MCV 97.0 07/28/2025 11:15 AM    MCH 31.9 07/28/2025 11:15 AM    MCHC 32.8 07/28/2025 11:15 AM    MPV 9.7 07/28/2025 11:15 AM    NEUTSPOLYS 68.90 07/28/2025 11:15 AM    LYMPHOCYTES 15.80 (L) 07/28/2025 11:15 AM    MONOCYTES 10.70 07/28/2025 11:15 AM    EOSINOPHILS 3.00 07/28/2025 11:15 AM    BASOPHILS 1.10 07/28/2025 11:15 AM      Lab Results   Component Value Date/Time    CHOLSTRLTOT 195 07/21/2025 04:00 PM     (H) 07/21/2025 04:00 PM    HDL 43 07/21/2025 04:00 PM    TRIGLYCERIDE 203 (H) 07/21/2025 04:00 PM       Lab Results   Component  Value Date/Time    TROPONINT 267 (H) 07/28/2025 1115     Lab Results   Component Value Date/Time    NTPROBNP 495 (H) 07/28/2025 1115     Lab Results   Component Value Date/Time    HBA1C 6.0 (H) 07/21/2025 1600       Thank you for allowing me to participate in the care of this patient.  Please contact me with any questions.    Carolina To M.D.  Cardiologist, [facility] [place]     This note was generated using voice recognition software which has a small chance of producing errors of grammar and possibly content. I have made every reasonable attempt to find and correct any obvious errors, but expect that some may not be found prior to finalization of this note.          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  On Prednisone 2.5 mg for the last 8 months    Primary hypertension 07/29/2024    Taking: Carvedilol, HCTZ, losartan  History of ow HR  Was following [facility] cardiology    Thyroid nodule    [2]   Past Surgical History:  Procedure Laterality Date    KNEE REPLACEMENT, TOTAL      right    OTHER ABDOMINAL SURGERY      diverticulitis   [3] (Not in a hospital admission)  [4]   No current facility-administered medications for this encounter.     Current Outpatient Medications   Medication Sig Dispense Refill    lidocaine (LIDODERM) 5 % Patch Place 1 Patch on the skin every 24 hours.      aspirin 81 MG EC tablet Take 81 mg by mouth every day.      doxycycline (VIBRAMYCIN) 100 MG Tab Take 100 mg by mouth 2 times a day. For 7 days      Probiotic Product (PROBIOTIC PO) Take 1 Capsule by mouth every day.       "FIBER GUMMIES PO Take 1 Each by mouth every day.      Homeopathic Products (ARNICARE ARNICA EX) Apply 1 Application topically as needed (pain/ swelling).      Liniments (DEEP BLUE RELIEF EX) Apply 1 Application topically as needed (dry skin).      ibuprofen (MOTRIN) 200 MG Tab Take 400 mg by mouth 1 time a day as needed for Mild Pain. 2 tablets= 400mg      prednisONE (DELTASONE) 2.5 MG Tab Take ONE tablet (2.5 MG total) by MOUTH daily. Take with food (Patient taking differently: Take 2.5 mg by mouth every day. Take ONE tablet (2.5 MG total) by MOUTH daily. Take with food) 90 Tablet 3    ondansetron (ZOFRAN ODT) 4 MG TABLET DISPERSIBLE Take 1 Tablet by mouth every 6 hours as needed for Nausea/Vomiting. 10 Tablet 2    losartan (COZAAR) 100 MG Tab Take 1 Tablet by mouth at bedtime. 90 Tablet 3    levETIRAcetam (KEPPRA) 500 MG Tab Take 1 Tablet by mouth 2 times a day. 180 Tablet 3    hydroCHLOROthiazide 25 MG Tab Take 1 Tablet by mouth every day. Take 1 tablet (25 mg total) by mouth daily. 90 Tablet 3    ezetimibe (ZETIA) 10 MG Tab Take 1 Tablet by mouth every day. 90 Tablet 3    carvedilol (COREG) 25 MG Tab Take 1 Tablet by mouth 2 times a day. 180 Tablet 2    umeclidinium-vilanterol (ANORO ELLIPTA) 62.5-25 MCG/ACT AEROSOL POWDER, BREATH ACTIVATED inhaler Inhale 1 Puff every day. 60 Each 11   [5]   Allergies  Allergen Reactions    Codeine Vomiting and Nausea          Penicillins Vomiting and Nausea    Skin Adhesives Rash     \"Tears skin apart\"     "

## 2025-07-28 NOTE — ASSESSMENT & PLAN NOTE
-Continue home prednisone 2.5mg daily. As patient is overall stable and able to take oral medications, will continue current dosage without stress dosing. If patient appears to clinically worsen or is unable to take oral medications then will have low threshold for stress dose steroids with prednisone 10mg daily.

## 2025-07-28 NOTE — ASSESSMENT & PLAN NOTE
Patient with fluctuating BP over the past few days inpatient. Received IV hydralazine 10mg on 7/29 in PM for SBP>200. Carvedilol held 7/29 in setting of bradycardia.   Per Cardiology recommendations:  -Recommend rechecking her blood pressure in 10-15 minutes if her BP spikes occurr during rest and she is asymptomatic.  -Recommend hydralazine 10 mg p.o. as needed for persistent hypertension episodes over  with rest  - Decrease Coreg further to 3.125 mg twice daily due to bradycardia  -Holding HCTZ due to orthostatic hypotension  -Continue home Losartan

## 2025-07-28 NOTE — DISCHARGE PLANNING
[facility] following. HTH/SCP chart review completed. Note pt currently in ED 2' to complaints of a syncopal event (on the toilet). Of note, patient was seen in [facility] ED 7/24/25 with similar complaints (noted to persistent lightheadedness and near syncopal episodes falling and striking her head). Patient was discharged home with instructions to follow up with her primary care provider.     Patient is noted to have a [facility] primary care physician and is noted to be scheduled for Established Patient with Nurse Practitioner AILEEN Chacko Thursday August 21 1:25 PM. There is not current mention of patient mobility and no O2 needs noted at this time. Based on current review, it is anticipated that pt will dc to home with outpatient follow ups once medically clear (either directly from ED or after admission to [facility] if warranted).     If patient admits to [facility], [facility] will continue to monitor and assist with SCP/HTH authorization needs for post acute services if indicated. Please reach out to [facility] via [facility] if post acute transitional care needs are warranted for dc planning. Thank you.      ADDENDUM 1:47PM- Per review, noted patient with inpatient hospital admission orders in the setting of syncope and collapse.

## 2025-07-28 NOTE — NON-PROVIDER
FAMILY MEDICINE HISTORY AND PHYSICAL   MEDICAL STUDENT NOTE     **THIS NOTE IS FOR EDUCATIONAL PURPOSES ONLY. PLEASE SEE FULL RESIDENT H&P NOTE**      PATIENT ID:  NAME:  Anaid Aponte  MRN:               [id number]  YOB: 1945    Date of Admission: 7/28/2025     Attending: Jose Ragsdale M.d.     Primary Care Physician:  AILEEN Cifuentes    CC:    Chief Complaint   Patient presents with    Syncope       HPI: Anaid Aponte is a 79 y.o. female w/PMHx HTN, COPD, Hx seizure 3 years who presented with sycopal event. Patient is in the process of moving in with her daughter, who is at bedside assisting with history. This morning, patient was feeling fatigued, with daughter noting she has had decreased energy for the last couple of days as well as decreased appetitie. Requested a shower. After her shower, needed to have a bowel movement. While she was on the toilet, she lost consciousness. Daughter notes she was coming in and out of consciousness for about 8 minutes. Pt does not have memory of EMS picking her up. Was having lightheadedness, nausea/vomiting ever since 7/24/25. Daughter notes that pt does not hydrate herself throughout the day so she gets 4oz pedialyte every morning per daughter. Denies any headaches, dizziness/vertigo, vision changes, memory loss, chest pain,   Patient came to the ED on 7/24/25 for syncopal fall with head strike with similar symptoms of lightheadedness, nausea/vomiting prior to event. Was on the ground for 45min at the time. Has had other falls in the past, once in 3/2025 where she sustained broken ribs. Patient stated this was a mechanical fall due to tripping on a threshold at her home.   Hx of seizure 3 years ago when she was living in [place], suspected etiology per her neurologists was for encephalitis. Has been on Keppra 500mg BID since then. No other known seizure history. Moved here from [place] 1 yr ago, has appointment on 9/07/25 with neurology  here. Neurology in [place] currently following until then.  Daughter ensures she takes all her medications daily since 2 weeks ago when she took over medications. Patient was previously managing her medications herself but daughter notes she believes she was not taking them consistently.     Pt has a h/o COPD with a recent COPD exacerbation 2-3 weeks ago tx with oral prednisone successfully. This morning pt reports SOB. She currently uses a home albuterol rescue inhaler, which she denied using earlier today, and Anoro Ellipta daily. No home O2.      Was on Zoloft 1 week ago due to anxiety and depressed mood, took for a few days but did not like it so stopped.     ER Course:  Patient here with syncope. Patient basic labs were reassuring.  Troponin is significant elevated at around 270, this to be consistent with likely cardiogenic syncope as a cause of patient's symptoms. Cardiology would like us to trend the troponins and hold on heparin unless these are worsening significantly. EKG showed sinus bradycardia with multiple PACs, no ST elevation, and mild ST depressions in the lateral leads. CXR showed bibasilar atelectasis vs infiltrate with possible small effusions. As patient with recent CT head and no trauma unlikely that repeating is currently indicated.   Given her recurrence of syncope, and no obvious evidence of orthostasis here, large fall risk, and advanced age concern for possible cardiac etiology as the cause of patient's symptoms here.  Patient admitted to telementary for echocardiogram and ongoing telemetry monitoring.        REVIEW OF SYSTEMS:   Ten systems reviewed and were negative except as noted in the HPI.                PAST MEDICAL HISTORY:   has a past medical history of COPD (chronic obstructive pulmonary disease) (HCC) (07/29/2024), Diverticulitis, Encephalitis (07/29/2024), Hyperlipidemia (07/29/2024), Polyarthritis rheumatica (HCC) (07/29/2024), Primary hypertension (07/29/2024), and  Thyroid nodule.     PAST SURGICAL HISTORY:   has a past surgical history that includes other abdominal surgery and knee replacement, total.     FAMILY HISTORY:  family history includes Heart Disease in her father; Hyperlipidemia in her father; No Known Problems in her brother and mother.     SOCIAL HISTORY:   Employment: Retired  Living Situation: Is moving into a mother-in-laws quarter attached to her daughters house  Smokin PY, quit 30 yrs ago  Etoh: No  Recreational Drug: None    DIET:   No orders of the defined types were placed in this encounter.      ALLERGIES:  Allergies[1]    OUTPATIENT MEDICATIONS:  Medications - Current, Listed Continuously[2]    PHYSICAL EXAM:  Vitals:    25 1108 25 1113 25 1400   BP: 134/70     Pulse: (!) 48  (!) 50   Resp: 15     Temp: 36 °C (96.8 °F)     TempSrc: Temporal     SpO2: 92%  99%   Weight:  71.7 kg (158 lb)    , Temp (24hrs), Av °C (96.8 °F), Min:36 °C (96.8 °F), Max:36 °C (96.8 °F)  , Pulse Oximetry: 99 %, O2 Delivery Device: None - Room Air        LAB TESTS:   Results for orders placed or performed during the hospital encounter of 25   CBC with Differential    Collection Time: 25 11:15 AM   Result Value Ref Range    WBC 7.4 4.8 - 10.8 K/uL    RBC 4.05 (L) 4.20 - 5.40 M/uL    Hemoglobin 12.9 12.0 - 16.0 g/dL    Hematocrit 39.3 37.0 - 47.0 %    MCV 97.0 81.4 - 97.8 fL    MCH 31.9 27.0 - 33.0 pg    MCHC 32.8 32.2 - 35.5 g/dL    RDW 40.8 35.9 - 50.0 fL    Platelet Count 268 164 - 446 K/uL    MPV 9.7 9.0 - 12.9 fL    Neutrophils-Polys 68.90 44.00 - 72.00 %    Lymphocytes 15.80 (L) 22.00 - 41.00 %    Monocytes 10.70 0.00 - 13.40 %    Eosinophils 3.00 0.00 - 6.90 %    Basophils 1.10 0.00 - 1.80 %    Immature Granulocytes 0.50 0.00 - 0.90 %    Nucleated RBC 0.00 0.00 - 0.20 /100 WBC    Neutrophils (Absolute) 5.10 1.82 - 7.42 K/uL    Lymphs (Absolute) 1.17 1.00 - 4.80 K/uL    Monos (Absolute) 0.79 0.00 - 0.85 K/uL    Eos (Absolute) 0.22 0.00 -  0.51 K/uL    Baso (Absolute) 0.08 0.00 - 0.12 K/uL    Immature Granulocytes (abs) 0.04 0.00 - 0.11 K/uL    NRBC (Absolute) 0.00 K/uL   Complete Metabolic Panel (CMP)    Collection Time: 25 11:15 AM   Result Value Ref Range    Sodium 136 135 - 145 mmol/L    Potassium 3.5 (L) 3.6 - 5.5 mmol/L    Chloride 100 96 - 112 mmol/L    Co2 23 20 - 33 mmol/L    Anion Gap 13.0 7.0 - 16.0    Glucose 137 (H) 65 - 99 mg/dL    Bun 29 (H) 8 - 22 mg/dL    Creatinine 1.22 0.50 - 1.40 mg/dL    Calcium 9.2 8.5 - 10.5 mg/dL    Correct Calcium 10.1 8.5 - 10.5 mg/dL    AST(SGOT) 44 12 - 45 U/L    ALT(SGPT) 22 2 - 50 U/L    Alkaline Phosphatase 62 30 - 99 U/L    Total Bilirubin 0.6 0.1 - 1.5 mg/dL    Albumin 2.9 (L) 3.2 - 4.9 g/dL    Total Protein 5.0 (L) 6.0 - 8.2 g/dL    Globulin 2.1 1.9 - 3.5 g/dL    A-G Ratio 1.4 g/dL   proBrain Natriuretic Peptide, NT (BNP)    Collection Time: 25 11:15 AM   Result Value Ref Range    NT-proBNP 495 (H) 0 - 125 pg/mL   Troponins NOW    Collection Time: 25 11:15 AM   Result Value Ref Range    Troponin T 267 (H) 6 - 19 ng/L   ESTIMATED GFR    Collection Time: 25 11:15 AM   Result Value Ref Range    GFR (CKD-EPI) 45 (A) >60 mL/min/1.73 m 2   Troponins in two (2) hours    Collection Time: 25  1:56 PM   Result Value Ref Range    Troponin T 225 (H) 6 - 19 ng/L   EKG    Collection Time: 25  2:01 PM   Result Value Ref Range    Report       [facility] Emergency Dept.    Test Date:  2025  Pt Name:    CEE CASTANON                 Department: ER  MRN:        [id number]                      Room:       RD 04  Gender:     Female                       Technician: [id number]  :        1945                   Requested By:ER TRIAGE PROTOCOL  Order #:    [id number]                    Reading MD: Yuri Garcia MD    Measurements  Intervals                                Axis  Rate:       52                           P:          57  NJ:         152                   "        QRS:        -43  QRSD:       100                          T:          214  QT:         501  QTc:        466    Interpretive Statements  Sinus bradycardia with multiple PACs, no ST elevation, mild ST depressions in  the lateral leads  Electronically Signed On 07- 14:00:59 PDT by Yuri Garcia MD          CULTURES:   Results       ** No results found for the last 168 hours. **            IMAGES:  DX-CHEST-PORTABLE (1 VIEW)   Final Result      Bibasilar atelectasis versus infiltrate with possible small effusions.           CONSULTS:   Cardiology consulted    ASSESSMENT/PLAN:   79 y.o. female admitted for syncope and elevated troponin.     #Syncope  Given her recurrence of syncope, and no obvious evidence of orthostasis here, large fall risk, and advanced age, concern for possible cardiac etiology as the cause of patient's symptoms here. Elevated troponin of 265 in ED.    -Trend troponin per cardiology    -Inpatient echo ordered   -Telemetry for cardiac monitoring.     #History of Seizure  Reports history of seizure 3 years ago. Etiology was presumed to be encephalitis and patient was started on Keppra 500mg BID. Has continued the same dose since. Pt still followed by home neurologist in [place] until she can establish with Neurologist here.    -Continue home Keppra   -Seizure precautions    #COPD- (present on admission)  Pt smoked for 25 yrs but stopped 30 years ago. Stable on regimen of Anoro Ellipta inhaler daily and rescue albuterol when needed.    -Continue home regimen   -continuous pulse ox maintaining saturation of 88% when asleep and 90% when awake      CODE Status: No Order      Wayne Arauz, Student MS3         [1]   Allergies  Allergen Reactions    Codeine Vomiting and Nausea          Penicillins Vomiting and Nausea    Skin Adhesives Rash     \"Tears skin apart\"   [2] No current facility-administered medications for this encounter.    Current Outpatient Medications:     lidocaine " (LIDODERM) 5 % Patch, Place 1 Patch on the skin every 24 hours., Disp: , Rfl:     aspirin 81 MG EC tablet, Take 81 mg by mouth every day., Disp: , Rfl:     doxycycline (VIBRAMYCIN) 100 MG Tab, Take 100 mg by mouth 2 times a day. For 7 days, Disp: , Rfl:     Probiotic Product (PROBIOTIC PO), Take 1 Capsule by mouth every day., Disp: , Rfl:     FIBER GUMMIES PO, Take 1 Each by mouth every day., Disp: , Rfl:     Homeopathic Products (ARNICARE ARNICA EX), Apply 1 Application topically as needed (pain/ swelling)., Disp: , Rfl:     Liniments (DEEP BLUE RELIEF EX), Apply 1 Application topically as needed (dry skin)., Disp: , Rfl:     ibuprofen (MOTRIN) 200 MG Tab, Take 400 mg by mouth 1 time a day as needed for Mild Pain. 2 tablets= 400mg, Disp: , Rfl:     prednisONE (DELTASONE) 2.5 MG Tab, Take ONE tablet (2.5 MG total) by MOUTH daily. Take with food (Patient taking differently: Take 2.5 mg by mouth every day. Take ONE tablet (2.5 MG total) by MOUTH daily. Take with food), Disp: 90 Tablet, Rfl: 3    ondansetron (ZOFRAN ODT) 4 MG TABLET DISPERSIBLE, Take 1 Tablet by mouth every 6 hours as needed for Nausea/Vomiting., Disp: 10 Tablet, Rfl: 2    losartan (COZAAR) 100 MG Tab, Take 1 Tablet by mouth at bedtime., Disp: 90 Tablet, Rfl: 3    levETIRAcetam (KEPPRA) 500 MG Tab, Take 1 Tablet by mouth 2 times a day., Disp: 180 Tablet, Rfl: 3    hydroCHLOROthiazide 25 MG Tab, Take 1 Tablet by mouth every day. Take 1 tablet (25 mg total) by mouth daily., Disp: 90 Tablet, Rfl: 3    ezetimibe (ZETIA) 10 MG Tab, Take 1 Tablet by mouth every day., Disp: 90 Tablet, Rfl: 3    carvedilol (COREG) 25 MG Tab, Take 1 Tablet by mouth 2 times a day., Disp: 180 Tablet, Rfl: 2    umeclidinium-vilanterol (ANORO ELLIPTA) 62.5-25 MCG/ACT AEROSOL POWDER, BREATH ACTIVATED inhaler, Inhale 1 Puff every day., Disp: 60 Each, Rfl: 11

## 2025-07-28 NOTE — ASSESSMENT & PLAN NOTE
Most recent lipid panel from 7/21/25 showed LDL cholesterol 111, HDL 43, Tgs 203, total cholesterol 193.   - Continue home Zetia  - Per cardiology recommendations, will add on statin at this time with atorvastatin 40mg daily

## 2025-07-28 NOTE — ASSESSMENT & PLAN NOTE
Currently stable on room air. No home O2 requirement. Has home rescue albuterol which she has not needed to use recently as well as daily Anoro Ellipta inhaler. Was noted to have some mild expiratory wheezing bilaterally on exam but otherwise normal effort.   - RT consult, continuous pulse ox  - Continue home Anoro Ellipta  - Prn albuterol

## 2025-07-28 NOTE — ED PROVIDER NOTES
ED Provider Note    CHIEF COMPLAINT  Chief Complaint   Patient presents with    Syncope         HPI/ROS    OUTSIDE HISTORIAN(S):  Patient's daughter who states she was helping her mother get changed when she all of a sudden lost consciousness, patient seemed like she was turning blue and had no obvious palpable pulse, patient then woke up and had almost immediate return to normal consciousness    Anaid Aponte is a 79 y.o. female who presents after syncopal event.  Patient took a shower, and then was sitting on the commode, she has been having some diarrhea, she had finished her bowel movement and her daughter who is her primary caregiver was helping her change, while helping her change while patient was still sitting the patient syncopized, passing out and falling into her daughter's arms.  Loss of consciousness was approximately 1 to 2 minutes.  When patient woke she returned to normal level of consciousness.  Patient had a syncopal episode approximately 5 days ago as well, at that time she had a CT head which was reassuring and basic labs were also reassuring, mildly elevated troponin.  Patient reports she feels generally weak but denies any focal weakness or numbness.  She reports she has had diarrhea for the last 3 weeks about 1 bowel movement per day.  She has not had much of an appetite.    PAST MEDICAL HISTORY   has a past medical history of COPD (chronic obstructive pulmonary disease) ([facility]) (07/29/2024), Diverticulitis, Encephalitis (07/29/2024), Hyperlipidemia (07/29/2024), Polyarthritis rheumatica (HCC) (07/29/2024), Primary hypertension (07/29/2024), and Thyroid nodule.    SURGICAL HISTORY   has a past surgical history that includes other abdominal surgery and knee replacement, total.    FAMILY HISTORY  Family History   Problem Relation Age of Onset    No Known Problems Mother     Heart Disease Father     Hyperlipidemia Father     No Known Problems Brother        SOCIAL HISTORY  Social History      Tobacco Use    Smoking status: Former     Current packs/day: 0.50     Average packs/day: 0.5 packs/day for 46.0 years (23.0 ttl pk-yrs)     Types: Cigarettes     Start date: 7/29/1979    Smokeless tobacco: Never   Vaping Use    Vaping status: Never Used   Substance and Sexual Activity    Alcohol use: Not Currently    Drug use: Not Currently    Sexual activity: Not Currently       CURRENT MEDICATIONS  Home Medications       Reviewed by Berkley Hoyos (Pharmacy Tech) on 07/28/25 at 1157  Med List Status: Complete     Medication Last Dose Status   aspirin 81 MG EC tablet 7/28/2025 Active   carvedilol (COREG) 25 MG Tab 7/28/2025 Active   doxycycline (VIBRAMYCIN) 100 MG Tab 7/12/2025 Active   ezetimibe (ZETIA) 10 MG Tab 7/28/2025 Active   FIBER GUMMIES PO 7/27/2025 Active   Homeopathic Products (ARNICARE ARNICA EX) 7/27/2025 Active   hydroCHLOROthiazide 25 MG Tab 7/28/2025 Active   ibuprofen (MOTRIN) 200 MG Tab 7/27/2025 Active   levETIRAcetam (KEPPRA) 500 MG Tab 7/28/2025 Active   lidocaine (LIDODERM) 5 % Patch 7/28/2025 Active   Liniments (DEEP BLUE RELIEF EX) 7/27/2025 Active   losartan (COZAAR) 100 MG Tab 7/27/2025 Active   ondansetron (ZOFRAN ODT) 4 MG TABLET DISPERSIBLE 7/26/2025 Active   prednisONE (DELTASONE) 2.5 MG Tab 7/28/2025 Active   Probiotic Product (PROBIOTIC PO) 7/28/2025 Active   umeclidinium-vilanterol (ANORO ELLIPTA) 62.5-25 MCG/ACT AEROSOL POWDER, BREATH ACTIVATED inhaler 7/28/2025 Active                  Audit from Redirected Encounters    **Home medications have not yet been reviewed for this encounter**         ALLERGIES  Allergies[1]    PHYSICAL EXAM  VITAL SIGNS: /70   Pulse (!) 48   Temp 36 °C (96.8 °F) (Temporal)   Resp 15   Wt 71.7 kg (158 lb)   SpO2 92%   BMI 27.12 kg/m²    Physical Exam  Constitutional:       Appearance: She is well-developed.   HENT:      Head: Normocephalic and atraumatic.   Eyes:      Pupils: Pupils are equal, round, and reactive to light.    Cardiovascular:      Rate and Rhythm: Normal rate and regular rhythm.   Pulmonary:      Effort: Pulmonary effort is normal. No accessory muscle usage or respiratory distress.      Breath sounds: Normal breath sounds.   Abdominal:      General: Bowel sounds are normal.      Palpations: Abdomen is soft. There is no mass.      Tenderness: There is no abdominal tenderness.   Musculoskeletal:         General: Normal range of motion.   Skin:     General: Skin is warm.      Capillary Refill: Capillary refill takes less than 2 seconds.   Neurological:      General: No focal deficit present.      Mental Status: She is alert and oriented to person, place, and time.      Comments: Strength is 5-5 in bilateral upper and lower extremities.  Sensation intact throughout.  No associated dysmetria.   Psychiatric:         Mood and Affect: Mood normal. Mood is not anxious.           EKG/LABS  Results for orders placed or performed during the hospital encounter of 07/28/25   CBC with Differential    Collection Time: 07/28/25 11:15 AM   Result Value Ref Range    WBC 7.4 4.8 - 10.8 K/uL    RBC 4.05 (L) 4.20 - 5.40 M/uL    Hemoglobin 12.9 12.0 - 16.0 g/dL    Hematocrit 39.3 37.0 - 47.0 %    MCV 97.0 81.4 - 97.8 fL    MCH 31.9 27.0 - 33.0 pg    MCHC 32.8 32.2 - 35.5 g/dL    RDW 40.8 35.9 - 50.0 fL    Platelet Count 268 164 - 446 K/uL    MPV 9.7 9.0 - 12.9 fL    Neutrophils-Polys 68.90 44.00 - 72.00 %    Lymphocytes 15.80 (L) 22.00 - 41.00 %    Monocytes 10.70 0.00 - 13.40 %    Eosinophils 3.00 0.00 - 6.90 %    Basophils 1.10 0.00 - 1.80 %    Immature Granulocytes 0.50 0.00 - 0.90 %    Nucleated RBC 0.00 0.00 - 0.20 /100 WBC    Neutrophils (Absolute) 5.10 1.82 - 7.42 K/uL    Lymphs (Absolute) 1.17 1.00 - 4.80 K/uL    Monos (Absolute) 0.79 0.00 - 0.85 K/uL    Eos (Absolute) 0.22 0.00 - 0.51 K/uL    Baso (Absolute) 0.08 0.00 - 0.12 K/uL    Immature Granulocytes (abs) 0.04 0.00 - 0.11 K/uL    NRBC (Absolute) 0.00 K/uL   Complete Metabolic  Panel (CMP)    Collection Time: 25 11:15 AM   Result Value Ref Range    Sodium 136 135 - 145 mmol/L    Potassium 3.5 (L) 3.6 - 5.5 mmol/L    Chloride 100 96 - 112 mmol/L    Co2 23 20 - 33 mmol/L    Anion Gap 13.0 7.0 - 16.0    Glucose 137 (H) 65 - 99 mg/dL    Bun 29 (H) 8 - 22 mg/dL    Creatinine 1.22 0.50 - 1.40 mg/dL    Calcium 9.2 8.5 - 10.5 mg/dL    Correct Calcium 10.1 8.5 - 10.5 mg/dL    AST(SGOT) 44 12 - 45 U/L    ALT(SGPT) 22 2 - 50 U/L    Alkaline Phosphatase 62 30 - 99 U/L    Total Bilirubin 0.6 0.1 - 1.5 mg/dL    Albumin 2.9 (L) 3.2 - 4.9 g/dL    Total Protein 5.0 (L) 6.0 - 8.2 g/dL    Globulin 2.1 1.9 - 3.5 g/dL    A-G Ratio 1.4 g/dL   proBrain Natriuretic Peptide, NT (BNP)    Collection Time: 25 11:15 AM   Result Value Ref Range    NT-proBNP 495 (H) 0 - 125 pg/mL   Troponins NOW    Collection Time: 25 11:15 AM   Result Value Ref Range    Troponin T 267 (H) 6 - 19 ng/L   ESTIMATED GFR    Collection Time: 25 11:15 AM   Result Value Ref Range    GFR (CKD-EPI) 45 (A) >60 mL/min/1.73 m 2   EKG    Collection Time: 25  2:01 PM   Result Value Ref Range    Report       [facility] Emergency Dept.    Test Date:  2025  Pt Name:    CEE CASTANON                 Department: ER  MRN:        [id number]                      Room:       RD 04  Gender:     Female                       Technician: [id number]  :        1945                   Requested By:ER TRIAGE PROTOCOL  Order #:    [id number]                    Reading MD: Yuri Garcia MD    Measurements  Intervals                                Axis  Rate:       52                           P:          57  ME:         152                          QRS:        -43  QRSD:       100                          T:          214  QT:         501  QTc:        466    Interpretive Statements  Sinus bradycardia with multiple PACs, no ST elevation, mild ST depressions in  the lateral leads  Electronically Signed On 2025  14:00:59 PDT by Yuri Garcia MD         I have independently interpreted this EKG    RADIOLOGY/PROCEDURES   I have independently interpreted the diagnostic imaging associated with this visit and am waiting the final reading from the radiologist.   My preliminary interpretation is as follows: No evidence of pneumothorax or lobar pneumonia    Radiologist interpretation:  DX-CHEST-PORTABLE (1 VIEW)   Final Result      Bibasilar atelectasis versus infiltrate with possible small effusions.          COURSE & MEDICAL DECISION MAKING    ASSESSMENT, COURSE AND PLAN  Care Narrative:   Patient here with recurrent syncope.  Given her recurrence of syncope, and no obvious evidence of orthostasis here, large fall risk, and advanced age I do believe that possible cardiac etiology is possible as the cause of patient's symptoms here.  Patient will therefore require admission for echocardiogram and ongoing telemetry monitoring.  In the interim check basic labs.  As patient with recent CT head and no trauma unlikely that repeating is currently indicated.  Patient basic labs are reassuring.  Troponin is significant elevated at around 270, this to be consistent with likely cardiogenic syncope as a cause of patient's symptoms.  I discussed the case with cardiology, they would like us to trend the troponins and hold on heparin unless these are worsening significantly, my concern about this being an occlusive process remains relatively low.  Patient will be admitted for ongoing telemetry monitoring and echocardiogram.  Cardiology will see patient.        DISPOSITION AND DISCUSSIONS  I have discussed management of the patient with the following physicians and MADELEINE's: Cardiology Dr. To  [facility] resident      FINAL DIAGNOSIS  1. Syncope, unspecified syncope type    2. Elevated troponin         Electronically signed by: Jose Welch M.D., 7/28/2025 11:23 AM           [1]   Allergies  Allergen Reactions    Codeine Vomiting and Nausea        "   Penicillins Vomiting and Nausea    Skin Adhesives Rash     \"Tears skin apart\"     "

## 2025-07-29 ENCOUNTER — APPOINTMENT (OUTPATIENT)
Dept: RADIOLOGY | Facility: MEDICAL CENTER | Age: 80
DRG: 312 | End: 2025-07-29
Payer: MEDICARE

## 2025-07-29 PROBLEM — R53.1 UNILATERAL WEAKNESS: Status: ACTIVE | Noted: 2025-07-29

## 2025-07-29 PROBLEM — R19.7 DIARRHEA IN ADULT PATIENT: Status: ACTIVE | Noted: 2025-07-29

## 2025-07-29 PROCEDURE — A9502 TC99M TETROFOSMIN: HCPCS

## 2025-07-29 ASSESSMENT — ENCOUNTER SYMPTOMS
NAUSEA: 0
PSYCHIATRIC NEGATIVE: 1
PALPITATIONS: 0
ABDOMINAL PAIN: 0
VOMITING: 0
SHORTNESS OF BREATH: 0
COUGH: 0
DIARRHEA: 1
DIZZINESS: 0
ORTHOPNEA: 0

## 2025-07-29 ASSESSMENT — GAIT ASSESSMENTS
GAIT LEVEL OF ASSIST: CONTACT GUARD ASSIST
DEVIATION: BRADYKINETIC;SHUFFLED GAIT
DISTANCE (FEET): 50
ASSISTIVE DEVICE: FRONT WHEEL WALKER

## 2025-07-29 ASSESSMENT — COGNITIVE AND FUNCTIONAL STATUS - GENERAL
SUGGESTED CMS G CODE MODIFIER MOBILITY: CK
STANDING UP FROM CHAIR USING ARMS: A LITTLE
CLIMB 3 TO 5 STEPS WITH RAILING: A LOT
MOBILITY SCORE: 17
TURNING FROM BACK TO SIDE WHILE IN FLAT BAD: A LITTLE
MOVING FROM LYING ON BACK TO SITTING ON SIDE OF FLAT BED: A LITTLE
MOVING TO AND FROM BED TO CHAIR: A LITTLE
WALKING IN HOSPITAL ROOM: A LITTLE

## 2025-07-29 ASSESSMENT — FIBROSIS 4 INDEX: FIB4 SCORE: 2.77

## 2025-07-29 ASSESSMENT — PAIN DESCRIPTION - PAIN TYPE
TYPE: ACUTE PAIN
TYPE: ACUTE PAIN

## 2025-07-29 ASSESSMENT — ACTIVITIES OF DAILY LIVING (ADL): TOILETING: INDEPENDENT

## 2025-07-29 NOTE — PROGRESS NOTES
Monitor Summary:    Rhythm: SB-SR  Rate: 52-61  Ectopy: (F) PVC, (F) PAC, coup  Measurement : .15/.07/.48

## 2025-07-29 NOTE — RESPIRATORY CARE
"COPD EDUCATION by COPD CLINICAL EDUCATOR  7/29/2025  at  9:47 AM by Sanaz Newell, RRT     Patient interviewed by education team.  Patient declined or is unable to participate in the full program.  Therefore, a short intervention has been conducted.  Medications reviewed with patient and when to use each. Patient aware of new patient pulmonary appointment in August to establish care.     COPD Screen  COPD Risk Screening  Do you have a history of COPD?: Yes  Do you have a Pulmonologist?: Yes    COPD Assessment  COPD Clinical Specialists ONLY  COPD Education Initiated: Yes--Short Intervention   Is this a COPD exacerbation patient?: No  DME Company: None  DME Equipment Type: None  Pulmonary Follow Up Appointment: 08/06/25  Appt Time: 1340  Pulmonologist Name: [facility], Dr. Birch  Referrals Initiated: Yes  Pulmonary Rehab: No  Smoking Cessation: N/A  Hospice: No  Home Health Care: No  Mobile Urgent Care Services: Yes  Geriatric Specialty Group: No  Private In-Home Care Agency: No  (OP) Pulmonary Function Testing: Yes (states in [place], no records found)  Interdisciplinary Rounds: Attendance at Rounds (30 Min)    Meds to Beds  [facility] provides bedside medication delivery for all eligible patients at discharge and you have been automatically enrolled in the Meds to Beds Program. Would you like to opt out of this program for any reason?: No - Stay Opted In     MY COPD ACTION PLAN     It is recommended that patients and physicians/healthcare providers complete this action plan together. This plan should be discussed at each physician visit and updated as needed.    The green, yellow and red zones show groups of symptoms of COPD. This list of symptoms is not comprehensive, and you may experience other symptoms. In the \"Actions\" column, your healthcare provider has recommended actions for you to take based on your symptoms.    Patient Name: Anaid Aponte   YOB: 1945   Last Updated on: " "7/29/2025  9:46 AM   Green Zone:  I am doing well today Actions     Usual activitiy and exercise level   Take daily medications     Usual amounts of cough and phlegm/mucus   Use oxygen as prescribed     Sleep well at night   Continue regular exercise/diet plan     Appetite is good   At all times avoid cigarette smoke, inhaled irritants     Daily Medications (these medications are taken every day):   Umeclidinium and Vilanterol (Anoro) 1 Puff Once daily        Yellow Zone:  I am having a bad day or a COPD flare Actions     More breathless than usual   Continue daily medications     I have less energy for my daily activities   Use quick relief inhaler as ordered     Increased or thicker phlegm/mucus   Use oxygen as prescribed     Using quick relief inhaler/nebulizer more often   Get plenty of rest     Swelling of ankles more than usual   Use pursed lip breathing     More coughing than usual   At all times avoid cigarette smoke, inhaled irritants     I feel like I have a \"chest cold\"     Poor sleep and my symptoms woke me up     My appetite is not good     My medicine is not helping      Call provider immediately if symptoms don’t improve     Continue daily medications, add rescue medications:   Albuterol 2 Puffs PRN       Medications to be used during a flare up, (as Discussed with Provider):           Additional Information:  Use with spacer, can take prior to activity if needed    Red Zone:  I need urgent medical care Actions     Severe shortness of breath even at rest   Call 911 or seek medical care immediately     Not able to do any activity because of breathing      Fever or shaking chills      Feeling confused or very drowsy       Chest pains      Coughing up blood                  "

## 2025-07-29 NOTE — PROGRESS NOTES
Attempted orthostatic BP with patient. Starting BP in the supine position was 140/56. Patient was assisted into the sitting position in anticipation of a standing BP. Once patient attempted to stand, she fell back into the bed stating she was feeling too dizzy and needed to lay back down. Patient assisted back into the supine position to recover. One more attempt to get the patient to stand was made, but patient was unable to tolerate sitting up as she was still expressing that she was too dizzy. ESTEBAN Swain notified of results.

## 2025-07-29 NOTE — CARE PLAN
The patient is Watcher - Medium risk of patient condition declining or worsening    Shift Goals  Clinical Goals: safety, monitor labs  Patient Goals: rest, less diarrhea  Family Goals: support    Progress made toward(s) clinical / shift goals:    Problem: Skin Integrity  Goal: Skin integrity is maintained or improved  Outcome: Progressing  Note: Patient tolerating most Q2 turns, using TAPs system, patient able to tell staff when wet.      Problem: Fall Risk  Goal: Patient will remain free from falls  Outcome: Progressing  Note: Patient close to nurses station, strip alarm in use, patient uses call light when needing assistance.        Patient is not progressing towards the following goals:

## 2025-07-29 NOTE — PROGRESS NOTES
Bedside report received from off going RN/tech: Ananya, assumed care of patient.     Fall Risk Score: MODERATE RISK  Fall risk interventions in place: Provide patient/family education based on risk assessment, Educate patient/family to call staff for assistance when getting out of bed, Place fall precaution signage outside patient door, Place patient in room close to nursing station, and Utilize bed/chair fall alarm  Bed type: Regular (Jefry Score less than 17 interventions in place)  Patient on cardiac monitor: Yes  IVF/IV medications: Not Applicable   Oxygen: Room Air  Bedside sitter: Not Applicable   Isolation: Not applicable

## 2025-07-29 NOTE — DIETARY
"Nutrition Services: Initial Assessment     Day 1 7/28/2025of admit. Anaid Aponte is 79 y.o., female with admitting DX of Syncope and collapse [R55].    Consult Received for: MST - Malnutrition Screening Tool and Intake    Current Hospital Problems List: Diarrhea in adult patient, Syncope and collapse. Hypokalemia. Convulsion, COPD, primary hypertension, Polyarthritis rheumatica.      Nutrition Assessment:      Height: 162.6 cm (5' 4\")  Weight: 74.9 kg (165 lb 2 oz)  Weight taken via: Bed Scale  BMI Calculated: 27.55  BMI Classification: Overweight     Wt Readings from Last 20 Encounters:   07/29/25 74.9 kg (165 lb 2 oz)   07/24/25 71.7 kg (158 lb)   07/10/25 72.1 kg (159 lb)   03/18/25 73.5 kg (162 lb)   03/15/25 77.1 kg (170 lb)   02/27/25 74.8 kg (165 lb)   02/25/25 74.8 kg (165 lb)   11/08/24 75.8 kg (167 lb)   07/29/24 75.8 kg (167 lb)     Weight loss x 5 months not consider significant.     Objective:   Pertinent Medical Hx: Syncopal. COPD. Unspecified convulsion type   Pertinent Labs: Glucose 118, BUN 26   Pertinent Meds: Senna.   Skin/Wounds:  n/a   Food Allergies: NKFA  Last BM:  Type 5: Soft blob with clear cut edges (passed easily)  07/29/25       Current Diet Order/Intake:   NPO    Subjective:     Patient seen for MST. Patient was admitted following a syncopal episode and collapse after a fall resulting in a head strike. She reported experiencing n/v and lightheadedness prior to the event.  Noted recent fall in March caused the patient to sustain broken ribs per admission. The patient reported living with her daughter and son-in-law PTA. Family members have been preparing 3 meals daily for her. She stated she is only able to finish about 50% of the meals 2/2 large portion sizes but can consume PO> 85% if the portions are smaller. On average, she maintains eating X 3 small meals per day, along with snacks between meals.No fat or muscle wasting was observed during the NFPE.Patient agrees add Ensure HP BID " to her diet once PO resumes.    Patient reported UBW: N/A   Dietary Recall/Energy Intake: Patient reported a gradual decrease in appetite over the years, which she attributes to aging. She stated that she typically eats X  3 small meals daily, with some snacks in between    This indicates Sufficient energy intake.     Nutrition Focused Physical Exam (NFPE)  Weight Loss: not significant x 5 months review   Muscle Mass: Well Nourished  Subcutaneous Fat: Well Nourished  Fluid Accumulation: Trace edema RLE/LLE   Reduced  Strength: N/A in acute care setting.    Nutrition Diagnosis:      No nutrition diagnosis identify at this time.     based on RD assessment at this time, Patient does not meet criteria in congruence with ASPEN/Academy guidelines for malnutrition    Nutrition Interventions:      Recommend Ensure Plus High Protein (provides 350 calories) 20 g protein per 8 fl oz) BID.  Patient aware of active plan of care as appropriate.       Nutrition Monitoring and Evaluation:      Monitor nutrition POC, goal for > 50 % intake from meals and supplements.  Additional fluids per MD/DO  Monitor vital signs pertinent to nutrition.    RD following and will provide updated recommendations as indicated.      Miley BLANCHARD/AND CRITERIA FOR MALNUTRITION

## 2025-07-29 NOTE — PROGRESS NOTES
[facility] FAMILY MEDICINE PROGRESS NOTE         Attending: Jose Ragsdale M.d. M.D.  PATIENT: Anaid Aponte; [id number]; 1945    Hospital Day # Hospital Day: 2    ID: 79 y.o. 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      24 Hour Events: No acute events overnight     Subjective: Today the patient complains of diarrhea. She states she frequently takes the maximum dose of imodium. Patient also admits she has poor fluid intake at home, typically drinking very little water. She denies feeling dizzy or lightheaded since being hospitalized. No nausea, vomiting, nor diarrhea. No chest pain.     OBJECTIVE:  Temp:  [35.6 °C (96.1 °F)-36.6 °C (97.8 °F)] 36.5 °C (97.7 °F)  Pulse:  [46-72] 58  Resp:  [16-20] 18  BP: (102-187)/() 102/64  SpO2:  [92 %-99 %] 97 %    Intake/Output Summary (Last 24 hours) at 7/29/2025 1221  Last data filed at 7/29/2025 1135  Gross per 24 hour   Intake 0 ml   Output 750 ml   Net -750 ml       PE:  Gen: No acute distress, resting comfortably in bed  HEENT: normocephalic, atraumatic, EOMI  Pulm: clear to auscultation bilaterally, no respiratory distress   Cardio: RRR, no M/R/G  Abdom: soft, nontender, nondistended.  Neuro: no focal deficits, able to ambulate with 4-post walker. Attending neuro exam: asymmetric weakness of the left upper and lower extremities.      LABS:  Recent Labs     07/28/25  1115   WBC 7.4   RBC 4.05*   HEMOGLOBIN 12.9   HEMATOCRIT 39.3   MCV 97.0   MCH 31.9   RDW 40.8   PLATELETCT 268   MPV 9.7   NEUTSPOLYS 68.90   LYMPHOCYTES 15.80*   MONOCYTES 10.70   EOSINOPHILS 3.00   BASOPHILS 1.10     Recent Labs     07/28/25  1115 07/28/25  1356 07/29/25  1111   SODIUM 136  --  136   POTASSIUM 3.5*  --  4.2   CHLORIDE 100  --  100   CO2 23  --  25   BUN 29*  --  26*   CREATININE 1.22   "--  0.91   CALCIUM 9.2  --  9.4   MAGNESIUM  --  1.7 1.6   ALBUMIN 2.9*  --   --      Estimated GFR/CRCL = Estimated Creatinine Clearance: 49.7 mL/min (by C-G formula based on SCr of 0.91 mg/dL).  Recent Labs     07/28/25  1115 07/29/25  1111   GLUCOSE 137* 118*     Recent Labs     07/28/25  1115   ASTSGOT 44   ALTSGPT 22   TBILIRUBIN 0.6   ALKPHOSPHAT 62   GLOBULIN 2.1             No results for input(s): \"INR\", \"APTT\", \"FIBRINOGEN\" in the last 72 hours.    Invalid input(s): \"DIMER\"    MICROBIOLOGY:   No results found for: \"BLOODCULTU\", \"BLDCULT\", \"BCHOLD\"     IMAGING:   EC-ECHOCARDIOGRAM COMPLETE W/O CONT   Final Result      DX-CHEST-PORTABLE (1 VIEW)   Final Result      Bibasilar atelectasis versus infiltrate with possible small effusions.      MR-BRAIN-WITH & W/O    (Results Pending)   NM-CARDIAC STRESS TEST    (Results Pending)       MEDS:  Current Facility-Administered Medications   Medication Last Admin    loperamide (Imodium) capsule 2 mg      lactated ringers infusion New Bag at 07/29/25 1217    LORazepam (Ativan) tablet 0.25 mg      Respiratory Therapy Consult      [Held by provider] heparin injection 5,000 Units      acetaminophen (Tylenol) tablet 650 mg 650 mg at 07/29/25 0832    senna-docusate (Pericolace Or Senokot S) 8.6-50 MG per tablet 2 Tablet      And    polyethylene glycol/lytes (Miralax) Packet 1 Packet      aspirin EC tablet 81 mg 81 mg at 07/29/25 0548    carvedilol (Coreg) tablet 6.25 mg 6.25 mg at 07/29/25 0548    ezetimibe (Zetia) tablet 10 mg 10 mg at 07/29/25 0548    lidocaine (Asperflex) 4 % 1 Patch 1 Patch at 07/29/25 0548    levETIRAcetam (Keppra) tablet 500 mg 500 mg at 07/29/25 0549    losartan (Cozaar) tablet 100 mg 100 mg at 07/28/25 2024    umeclidinium-vilanterol (Anoro Ellipta) inhaler 1 Puff 1 Puff at 07/29/25 0550    ondansetron (Zofran ODT) dispertab 4 mg      predniSONE (Deltasone) tablet 2.5 mg 2.5 mg at 07/29/25 0549    [Held by provider] hydroCHLOROthiazide tablet 25 mg " 25 mg at 07/29/25 0549    albuterol (Proventil) 2.5mg/0.5ml nebulizer solution 2.5 mg      atorvastatin (Lipitor) tablet 40 mg 40 mg at 07/28/25 1804       PROBLEM LIST:  Problem Noted   Diarrhea in Adult Patient 7/29/2025   Unilateral Weakness 7/29/2025   Syncope and Collapse 7/28/2025   Hypokalemia 7/28/2025   Convulsions, Unspecified Convulsion Type (Hcc) 3/18/2025    Abstracted by HOLLIS KAMINSKI on 02/04/2025:  Last billed R56.9 Unspecified convulsions on 07/29/2024  by NATTY MCKEON in [place] [address]     Primary Hypertension 7/29/2024    Taking: Carvedilol, HCTZ, losartan  History of ow HR  Was following [facility] cardiology.  Diagnosed with CHF, but told resolved.         ASSESSMENT/PLAN: 79 y.o. 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    * Syncope and collapse- (present on admission)  Assessment & Plan  Patient with atypical presentation of syncope, with prodromal symptoms of lightheadedness, nausea, fatigue, and also reportedly cyanotic lips during event. Has history of seizures occurring 3 years ago and has been stable on Keppra without breakthrough seizures since then. CBC and CMP overall wnl other than hypokalemia. Concern for cardiogenic syncope vs orthostatic hypotension vs possible vasovagal vs lower concern for seizure episode.      - Cardiology consulted in ED, appreciate recommendations:  Orthostatic vital signs were abnormal. Echocardiogram showed mild LVH, EF 65%, mild mitral regurgitation. Troponin decreased from 267-227.   -NM stress test today  -Lower carvedilol dose to 6.25 mg p.o. twice daily hold for heart rate less than 55   -Continue ASA 81 mg, home BP meds including losartan and HCTZ. Continue Ezetimibe.   -Atorvastatin 40mg daily started in-patient.  -vEEG negative for acute seizure-like  activity  -Continue home Keppra    Unilateral weakness  Assessment & Plan  On physical examination today, the patient was noted to have weakness of the left upper and lower extremities. Onset is unclear. Stroke could have caused patient's syncope and collapse.   - MRI brain ordered.    Diarrhea in adult patient  Assessment & Plan  Patient reports a few month history of daily diarrhea, sometimes experiencing 4 bouts per day. She regularly takes imodium. She has not been evaluated for this previously. Given patient's orthostatic hypotension, volume depletion due to GI losses is likely contributing to her symptoms of syncope, dizziness, lightheadedness.   - Stool calprotectin and culture ordered  - IV fluids  - Likely refer to patient to GI as an out-patient   - Imodium ordered as in-patient, low concern is of infectious etiology due to length of symptoms.     Hypokalemia  Assessment & Plan  Hypokalemia of 3.5 on admission. Repleted with 40mEq PO Kdur. Mg+ 1.7.     Convulsions, unspecified convulsion type (HCC)- (present on admission)  Assessment & Plan  Patient's daughter reports history of convulsive seizures 3 years ago when patient lived in [place]. 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 Started on Keppra 500mg BID at the time, has continued on same dosage since then without breakthrough seizures. Currently still follows remotely with her neurologist from [place] with plans to establish with neurology here in [place] in 9/2025.   - Continue home Keppra  - EGG was normal.  - Brain MRI ordered.     Thyroid nodule- (present on admission)  Assessment & Plan  7/21/25 TSH decreased at 0.131, T4 wnl at 1.07. Euthyroid pattern. Will continue to monitor.     Hyperlipidemia- (present on admission)  Assessment & Plan  Most recent lipid panel from 7/21/25 showed LDL cholesterol 111, HDL 43, Tgs 203, total cholesterol 193.   - Continue home Zetia  - Per cardiology recommendations, will  add on statin at this time with atorvastatin 40mg daily    Polyarthritis rheumatica (HCC)- (present on admission)  Assessment & Plan  -Continue home prednisone 2.5mg daily. As patient is overall stable and able to take oral medications, will continue current dosage without stress dosing. If patient appears to clinically worsen or is unable to take oral medications then will have low threshold for stress dose steroids with prednisone 10mg daily.     Primary hypertension- (present on admission)  Assessment & Plan  -Continue carvedilol dose lowered to 6.25mg by neurology   -Holding HCTZ due to orthostatic hypotension  -Continue home Losartan      COPD (chronic obstructive pulmonary disease) (HCC)- (present on admission)  Assessment & Plan  Currently stable on room air. No home O2 requirement. Has home rescue albuterol which she has not needed to use recently as well as daily Anoro Ellipta inhaler. Was noted to have some mild expiratory wheezing bilaterally on exam but otherwise normal effort.   - RT consult, continuous pulse ox  - Continue home Anoro Ellipta  - Prn albuterol           Core Measures:  Fluids: LR at 83 mL/h  Lines: PIV  Abx: None  Diet: Regular  PPX: SCDs/TEDs      #DISPOSITION  - In-patient for further evaluation of syncope and collapse       Blanca Connelly D.O.  PGY-1  [facility]

## 2025-07-29 NOTE — DISCHARGE PLANNING
Case Management Discharge Planning    Admission Date: 7/28/2025  GMLOS: 2.7  ALOS: 1    6-Clicks ADL Score: 15  6-Clicks Mobility Score: 17  PT and/or OT Eval ordered: Yes  PT: Recommends HH  Post-acute Referrals Ordered: Yes  Post-acute Choice Obtained: NA  Has referral(s) been sent to post-acute provider:  NA      Anticipated Discharge Dispo: Discharge Disposition: D/T to home under HHA care in anticipation of covered skilled care (06)  Discharge Address: [address] [place] [address] [address] [place] [address]  Discharge Contact Phone Number: [phone]    DME Needed: Pending hospital course.     Action(s) Taken: Chart reviewed and pt discussed in IDT rounds pending cardiology for possible cath.     Escalations Completed: None    Medically Clear: No    Next Steps: F/U with HCM needs.     Barriers to Discharge: Medical clearance    Is the patient up for discharge tomorrow: No    Care Transition Team Assessment  LMSW met with pt to conduct assessment and verify demographics. Pt is AOX4 and provided all of the following information below. Pt was admitted on 07/28/25 for Syncope and collapse. Please see pt's H&P for prior medical history.    Pt's PCP is NATTY MCKEON A.P.R.     Pt verified address on file [address] [place], [place] [address] [address]. Pt stated it was a 2H and she lives in the in-law suite. Pt's daughter and son-in-law lives in the main house. There are no steps to enter the house.       Pt's emergency contact on file is pt's daughter Karla Sethi [phone].     Prior to admission pt was modified independent but stated that she needed more assistance from her daughter the last couple of days with ADLS and IADLS. Pt stated that she uses a walker and a cane. Pt does not use O2 at [facility].    Pt denies history of SNF/HH/IPR.    Pt's preferred pharmacy is [facility] on [address]     Pt's insurance is [facility] [employer].    Upon DC pt has means of transportation home via pt's daughter.     Information Source  Orientation Level: Oriented  X4  Information Given By: Patient  Who is responsible for making decisions for patient? : Patient    Readmission Evaluation  Is this a readmission?: No    Elopement Risk  Legal Hold: No  Ambulatory or Self Mobile in Wheelchair: Yes  Disoriented: No  Psychiatric Symptoms: None  History of Wandering: No  Elopement this Admit: No  Vocalizing Wanting to Leave: No  Displays Behaviors, Body Language Wanting to Leave: No-Not at Risk for Elopement  Elopement Risk: Not at Risk for Elopement    Interdisciplinary Discharge Planning  Lives with - Patient's Self Care Capacity: Adult Children, Related Adult  Patient or legal guardian wants to designate a caregiver: No  Support Systems: Children  Housing / Facility: [address] [place] [address]  Prior Services: Intermittent Physical Support for ADL Per Family    Discharge Preparedness  What is your plan after discharge?: Home health care  What are your discharge supports?: Child  Prior Functional Level: Independent with Activities of Daily Living, Needs Assist with Activities of Daily Living, Independent with Medication Management, Needs Assist with Medication Management  Difficulity with ADLs: Bathing, Dressing, Walking  Difficulity with IADLs: Driving    Functional Assesment  Prior Functional Level: Independent with Activities of Daily Living, Needs Assist with Activities of Daily Living, Independent with Medication Management, Needs Assist with Medication Management    Finances  Financial Barriers to Discharge: No  Prescription Coverage: Yes    Vision / Hearing Impairment  Vision Impairment : No  Hearing Impairment : Yes  Hearing Impairment: Both Ears, Hearing Device(s) Available  Does Pt Need Special Equipment for the Hearing Impaired?: No    Advance Directive  Advance Directive?: None    Domestic Abuse  Have you ever been the victim of abuse or violence?: No    Psychological Assessment  History of Substance Abuse: None  History of Psychiatric Problems: No  Non-compliant with Treatment:  No  Newly Diagnosed Illness: No    Discharge Risks or Barriers  Discharge risks or barriers?: No    Anticipated Discharge Information  Discharge Disposition: D/T to home under A care in anticipation of covered skilled care (06)  Discharge Address: [address] [place] [address] [address] [place] [address]  Discharge Contact Phone Number: [phone]

## 2025-07-29 NOTE — CARE PLAN
Problem: Knowledge Deficit - Standard  Goal: Patient and family/care givers will demonstrate understanding of plan of care, disease process/condition, diagnostic tests and medications  Description: Target End Date:  1-3 days or as soon as patient condition allows    Document in Patient Education    1.  Patient and family/caregiver oriented to unit, equipment, visitation policy and means for communicating concern  2.  Complete/review Learning Assessment  3.  Assess knowledge level of disease process/condition, treatment plan, diagnostic tests and medications  4.  Explain disease process/condition, treatment plan, diagnostic tests and medications  Outcome: Progressing     Problem: Skin Integrity  Goal: Skin integrity is maintained or improved  Description: Target End Date:  Prior to discharge or change in level of care    Document interventions on Skin Risk/Jefry flowsheet groups and corresponding LDA    1.  Assess and monitor skin integrity, appearance and/or temperature  2.  Assess risk factors for impaired skin integrity and/or pressures ulcers  3.  Implement precautions to protect skin integrity in collaboration with interdisciplinary team  4.  Implement pressure ulcer prevention protocol if at risk for skin breakdown  5.  Confirm wound care consult if at risk for skin breakdown  6.  Ensure patient use of pressure relieving devices  (Low air loss bed, waffle overlay, heel protectors, ROHO cushion, etc)  Outcome: Progressing     Problem: Fall Risk  Goal: Patient will remain free from falls  Description: Target End Date:  Prior to discharge or change in level of care    Document interventions on the Marycarmen Roberts Fall Risk Assessment    1.  Assess for fall risk factors  2.  Implement fall precautions  Outcome: Progressing     Problem: Pain - Standard  Goal: Alleviation of pain or a reduction in pain to the patient’s comfort goal  Description: Target End Date:  Prior to discharge or change in level of  care    Document on Vitals flowsheet    1.  Document pain using the appropriate pain scale per order or unit policy  2.  Educate and implement non-pharmacologic comfort measures (i.e. relaxation, distraction, massage, cold/heat therapy, etc.)  3.  Pain management medications as ordered  4.  Reassess pain after pain med administration per policy  5.  If opiods administered assess patient's response to pain medication is appropriate per POSS sedation scale  6.  Follow pain management plan developed in collaboration with patient and interdisciplinary team (including palliative care or pain specialists if applicable)  Outcome: Progressing   The patient is Watcher - Medium risk of patient condition declining or worsening    Shift Goals  Clinical Goals: safety, increase mobilization, monitor BP  Patient Goals: less back pain, comfort  Family Goals: not present    Progress made toward(s) clinical / shift goals:  patient went down for stress test, less bm today.    Patient is not progressing towards the following goals: remains dizzy

## 2025-07-29 NOTE — PROGRESS NOTES
"     Cardiology Follow-up Note    Name:   Anaid Aponte   YOB: 1945  Age:   79 y.o.  female   MRN:   [id number]        Attending Provider: Dr Jose Ragsdale M.D.     Chief Complaint: Syncope       Reason for cardiology consult: Syncope    Outpatient Cardiologist: None    History of Present Illness  Anaid Aponte is 79 y.o. female with prior medical history significant for hypertension, dyslipidemia, history of seizures on Keppra who was admitted on 7/28/2025 with syncope x 2.     Troponin 267, 225, 224, 227.    Interim Events 07/29/25   :  - Personal Telemetry interpretation: Sinus bradycardia in the 50s-sinus rhythm 60s with PVCs PACs  - Overnight events: No Cardiac events  Patient reported many loose stools overnight.  Reports chronic diarrhea for many months.  Additionally, reports severe fatigue  Patient denies chest pain, shortness of breath, edema, dizziness/lightheadedness, or palpitations.   - Vitals:   - Labs reviewed: Troponin 267, 225, 224, 227               Review of Systems   Constitutional:  Positive for malaise/fatigue.   Respiratory:  Negative for cough and shortness of breath.    Cardiovascular:  Negative for chest pain, palpitations, orthopnea and leg swelling.   Gastrointestinal:  Positive for diarrhea. Negative for abdominal pain, nausea and vomiting.   Neurological:  Negative for dizziness.   Psychiatric/Behavioral: Negative.          Medical History  Past Medical History[1]      Family History   Problem Relation Age of Onset    No Known Problems Mother     Heart Disease Father     Hyperlipidemia Father     No Known Problems Brother          Social History[2]      Allergies[3]      Medications   Scheduled Medications[4]        Physical Exam    Body mass index is 28.34 kg/m².     BP (!) 156/128   Pulse 72   Temp (!) 35.6 °C (96.1 °F) (Temporal)   Resp 18   Ht 1.626 m (5' 4\")   Wt 74.9 kg (165 lb 2 oz)   SpO2 96%      Vitals:    07/29/25 0732 07/29/25 0827 07/29/25 0830 " "07/29/25 0832   BP: (!) 180/66 (!) 170/65 (!) 161/101 (!) 156/128   Pulse:  (!) 53 65 72   Resp: 18      Temp: (!) 35.6 °C (96.1 °F)      TempSrc: Temporal      SpO2: 96%      Weight:       Height:            Oxygen Therapy:  Pulse Oximetry: 96 %, O2 (LPM): 0, O2 Delivery Device: None - Room Air      Physical Exam  Constitutional:       Appearance: Normal appearance.   Cardiovascular:      Rate and Rhythm: Regular rhythm. Bradycardia present.      Heart sounds: No murmur heard.     No gallop.   Pulmonary:      Breath sounds: No wheezing or rales.   Abdominal:      General: There is no distension.      Palpations: Abdomen is soft.   Musculoskeletal:      Right lower leg: No edema.      Left lower leg: No edema.   Skin:     General: Skin is warm and dry.   Neurological:      Mental Status: She is alert and oriented to person, place, and time.   Psychiatric:         Mood and Affect: Mood normal.         Behavior: Behavior normal.         Thought Content: Thought content normal.         Judgment: Judgment normal.               Labs (personally reviewed):     Estimated Creatinine Clearance: 37.1 mL/min (by C-G formula based on SCr of 1.22 mg/dL).    No results found for: \"BNPBTYPENAT\"  Recent Labs     07/28/25  1115 07/28/25  1356   CREATININE 1.22  --    BUN 29*  --    POTASSIUM 3.5*  --    SODIUM 136  --    CALCIUM 9.2  --    MAGNESIUM  --  1.7   CO2 23  --    ALBUMIN 2.9*  --      Recent Labs     07/28/25  1115   GLUCOSE 137*     Recent Labs     07/28/25  1115   ASTSGOT 44   ALTSGPT 22   ALKPHOSPHAT 62     Recent Labs     07/28/25  1115   WBC 7.4   HEMOGLOBIN 12.9   PLATELETCT 268     Recent Labs     07/28/25  1115 07/28/25  1356 07/28/25  1716 07/28/25  2208   TROPONINT 267* 225* 224* 227*   NTPROBNP 495*  --   --   --      Lab Results   Component Value Date/Time    CHOLSTRLTOT 195 07/21/2025 04:00 PM     (H) 07/21/2025 04:00 PM    HDL 43 07/21/2025 04:00 PM    TRIGLYCERIDE 203 (H) 07/21/2025 04:00 PM "       Imaging:  EC-ECHOCARDIOGRAM COMPLETE W/O CONT   Final Result      DX-CHEST-PORTABLE (1 VIEW)   Final Result      Bibasilar atelectasis versus infiltrate with possible small effusions.          Cardiac Imaging and Procedures Review        ECHOCARDIOGRAM 7/20/2025:  No prior study is available for comparison.      Mild concentric left ventricular hypertrophy.  Normal left ventricular systolic function.  The left ventricular ejection fraction is visually estimated to be 65%.  Normal right ventricular size and systolic function.  Moderate mitral annular calcification.  Mild mitral regurgitation.    STRESS TEST pending:        Assessment and Medical Decision Making:    Syncope and collapse x 2  Elevated troponin  Sinus bradycardia  Positive orthostatic blood pressure  Diarrhea  Hypertension      Recommendations:    Syncope  2 episodes of syncope and collapse.  History of a mechanical fall in April.  Patient is noted to be have sinus bradycardia.  She is on Coreg at home for high blood pressure.  No lethal arrhythmias noted on telemetry.  -Coreg was lowered yesterday due to bradycardia 6.25 mg twice daily  - Near syncope during the orthostatic BP check.  Patient reported severe dizziness and started falling backwards after the initial position change from lying to standing. She was assisted to bed by bedside RN.  Was not able to complete the orthostatic BP test.  's upon me checking on patient a couple minutes after.  -Continue telemetry monitoring  -Patient reports constant daily diarrhea and fatigue for 5-6 months.  She takes Imodium at home to help with diarrhea.  Patient tells me she has never had a workup for it besides a colonoscopy 1 time in her life.  She also reports drinking no more than about 500 mL of water per day.  Highly suspicious of dehydration as the etiology of her syncope.  -Notified the primary team of report of chronic diarrhea.  Recommend the GI workup.  -Continue optimizing  electrolytes to keep potassium above 4 and magnesium above 2  -Check BMP and magnesium level today.  Elevated troponin  -Regarding the elevated troponin level, discussed proceeding with an ischemic workup with a nuclear stress test.  Patient agrees.  -Continue aspirin 81 mg daily, atorvastatin 40 mg daily, ezetimibe 10 mg daily  -Continue losartan 100 mg daily  -Consider discontinuing hydrochlorothiazide to avoid further sources of the dehydration.          Future Appointments   Date Time Provider Department Center   8/6/2025  1:40 PM [facility]   8/21/2025  1:40 PM AILEEN Cifuentes [facility]   9/30/2025  2:00 PM Joshua Whitley M.D. [facility] Lakisha BECKER personally discussed her case with  Blanca Krishnan M.D.    Please contact me with any questions.  Thank you for allowing us to participate in the care of Ms. Aponte.        AILEEN Dorman  [facility]  [phone]      Please see Dr. To  attestation for further details and MDM. Anayeli BECKER A.P.R.N. performed a portion of the service face-to-face with the same patient on the same date of service independently of Dr. To FOR 15 minutes preparing to see the patient, reviewing hospital notes and tests, obtaining history from the patient, performing a medically appropriate exam, counseling and educating the patient, ordering medications/tests/procedures/referrals as clinically indicated, and documenting information in the electronic medical record.    Please note this dictation was created using voice recognition software.  I have made every reasonable attempt to correct obvious errors, but there may be errors of grammar and possibly content that I did not discover before finalizing the note.         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  On Prednisone 2.5 mg for the last 8 months    Primary hypertension 07/29/2024    Taking: Carvedilol, HCTZ, losartan  History of ow HR  Was following [facility] cardiology    Thyroid nodule    [2]   Social History  Tobacco Use    Smoking status: Former     Average packs/day: 0.5 packs/day for 15.4 years (7.7 ttl pk-yrs)     Types: Cigarettes     Start date: 7/29/1979    Smokeless tobacco: Never   Vaping Use    Vaping status: Never Used   Substance Use Topics    Alcohol use: Not Currently    Drug use: Not Currently   [3]   Allergies  Allergen Reactions    Codeine Vomiting and Nausea          Penicillins Vomiting and Nausea    Skin Adhesives Rash     \"Tears skin apart\"   [4]   Scheduled Medications   Medication Dose Frequency    [Held by provider] heparin  5,000 Units Q8HRS    senna-docusate  2 Tablet Q EVENING    aspirin  81 mg DAILY    carvedilol  6.25 mg BID    ezetimibe  10 mg DAILY    lidocaine  1 Patch DAILY    levETIRAcetam  500 mg BID    losartan  100 mg QHS    umeclidinium-vilanterol  1 Puff DAILY    prednisONE  2.5 mg DAILY    hydroCHLOROthiazide  25 mg DAILY    atorvastatin  40 mg Q EVENING     "

## 2025-07-29 NOTE — ASSESSMENT & PLAN NOTE
On physical examination 7/29, the patient was noted to have weakness of the left upper and lower extremities. Onset is unclear. On exam today strength was intact bilaterally in upper and lower extremities.   - MRI brain ordered, completed 7/30/25 with no acute intracranial abnormalities

## 2025-07-29 NOTE — THERAPY
"Physical Therapy   Initial Evaluation     Patient Name:  Anaid Aponte  Age:  79 y.o., Sex:  female  Medical Record #:  [id number]  Today's Date: 7/29/2025     Precautions  Medical: Fall Risk    Assessment  Patient is a 79 y.o. female who was admitted following a syncopal episode in the bathroom at home. PMH significant for HTN, COPD, seizure.    Pt received in a chair at bedside and agreeable to PT evaluation. Pt presents with generalized weakness and reports she has not been eating much lately due to diarrhea at home. Pt required CGA to min A to mobilize with a FWW. Pt reports her daughter assists her as needed. Pt would benefit from HHPT to progress with strengthening and balance over time. Will follow for acute PT to progress as able while admitted.    Plan    Physical Therapy Initial Treatment Plan   Treatment Plan : Bed Mobility, Gait Training, Neuro Re-Education / Balance, Self Care / Home Evaluation, Stair Training, Therapeutic Activities, Therapeutic Exercise  Treatment Frequency: 3 Times per Week  Duration: Until Therapy Goals Met    DC Equipment Recommendations: None  Discharge Recommendations: Recommend home health for continued physical therapy services       Subjective    \"I haven't been eating much for the past few weeks because I have diarrhea every time\"     Objective       07/29/25 0911   Precautions   Medical Fall Risk   Vitals   O2 (LPM) 0   O2 Delivery Device None - Room Air   Vitals Comments Asymptomatic with positional changes   Pain 0 - 10 Group   Therapist Pain Assessment Post Activity Pain Same as Prior to Activity;Nurse Notified  (no specific c/o pain this session)   Prior Living Situation   Prior Services Intermittent Physical Support for ADL Per Family   Housing / Facility 1 Story House   Steps Into Home 0   Steps In Home 0   Equipment Owned 4-Wheel Walker;Single Point Cane   Lives with - Patient's Self Care Capacity Adult Children;Related Adult   Comments Pt reports she and her " daughter/LOLITA are moving into a new home today where she has an attached apartment unit. Pt reports her daughter assists her whenever she needs it   Prior Level of Functional Mobility   Bed Mobility Independent   Transfer Status Independent   Ambulation Independent   Ambulation Distance primarily household distances   Assistive Devices Used 4-Wheel Walker   Comments Pt reports she is usually independent, but occasionally needed assist from her daughter   History of Falls   History of Falls Yes   Date of Last Fall   (reports prior falls)   Cognition    Level of Consciousness Alert   Comments Pleasant and cooperative, receptive to education   Active ROM Lower Body    Active ROM Lower Body  WDL   Strength Lower Body   Comments BLE generalized weakness, grossly 3+/5   Sensation Lower Body   Lower Extremity Sensation   WDL   Coordination Lower Body    Coordination Lower Body  WDL   Balance Assessment   Sitting Balance (Static) Fair   Sitting Balance (Dynamic) Fair   Standing Balance (Static) Fair   Standing Balance (Dynamic) Fair -   Weight Shift Sitting Fair   Weight Shift Standing Fair   Comments FWW in standing   Bed Mobility    Comments in chair pre and post   Gait Analysis   Gait Level Of Assist Contact Guard Assist   Assistive Device Front Wheel Walker   Distance (Feet) 50   # of Times Distance was Traveled 2   Deviation Bradykinetic;Shuffled Gait   Comments Limited by fatigue   Functional Mobility   Sit to Stand Minimal Assist   Bed, Chair, Wheelchair Transfer Minimal Assist   Transfer Method Stand Step   Mobility up with FWW   Comments cues for hand placement on FWW   6 Clicks Assessment - How much HELP from from another person do you currently need... (If the patient hasn't done an activity recently, how much help from another person do you think he/she would need if he/she tried?)   Turning from your back to your side while in a flat bed without using bedrails? 3   Moving from lying on your back to sitting on  the side of a flat bed without using bedrails? 3   Moving to and from a bed to a chair (including a wheelchair)? 3   Standing up from a chair using your arms (e.g., wheelchair, or bedside chair)? 3   Walking in hospital room? 3   Climbing 3-5 steps with a railing? 2   6 clicks Mobility Score 17   Short Term Goals    Short Term Goal # 1 Pt will complete bed mobility with supervision from a flat bed to progress function in 6 visits.   Short Term Goal # 2 Pt will transfer with FWW and supervision to progress function in 6 visits.   Short Term Goal # 3 Pt will ambulate 150ft with FWW and supervision to progress function in 6 visits.   Education Group   Education Provided Role of Physical Therapist   Role of Physical Therapist Patient Response Patient;Acceptance;Explanation;Verbal Demonstration   Physical Therapy Initial Treatment Plan    Treatment Plan  Bed Mobility;Gait Training;Neuro Re-Education / Balance;Self Care / Home Evaluation;Stair Training;Therapeutic Activities;Therapeutic Exercise   Treatment Frequency 3 Times per Week   Duration Until Therapy Goals Met   Problem List    Problems Functional Strength Deficit;Impaired Ambulation;Impaired Transfers;Impaired Bed Mobility;Decreased Activity Tolerance   Anticipated Discharge Equipment and Recommendations   DC Equipment Recommendations None   Discharge Recommendations Recommend home health for continued physical therapy services   Interdisciplinary Plan of Care Collaboration   IDT Collaboration with  Nursing   Patient Position at End of Therapy Seated;Chair Alarm On;Call Light within Reach;Phone within Reach;Tray Table within Reach   Collaboration Comments RN updated   Session Information   Date / Session Number  7/29-1 (1/3, 8/4)

## 2025-07-29 NOTE — ASSESSMENT & PLAN NOTE
Patient reports a few month history of daily diarrhea, sometimes experiencing 4 bouts per day. She regularly takes imodium. She has not been evaluated for this previously. Given patient's orthostatic hypotension, volume depletion due to GI losses is likely contributing to her symptoms of syncope, dizziness, lightheadedness.   - Stool calprotectin and culture ordered  - IV fluids  - Likely refer to patient to GI as an out-patient   - Imodium ordered as in-patient, low concern is of infectious etiology due to length of symptoms.

## 2025-07-30 ENCOUNTER — APPOINTMENT (OUTPATIENT)
Dept: RADIOLOGY | Facility: MEDICAL CENTER | Age: 80
DRG: 312 | End: 2025-07-30
Payer: MEDICARE

## 2025-07-30 PROCEDURE — 70553 MRI BRAIN STEM W/O & W/DYE: CPT

## 2025-07-30 ASSESSMENT — ENCOUNTER SYMPTOMS
VOMITING: 0
DIARRHEA: 1
DIZZINESS: 0
ABDOMINAL PAIN: 0
SHORTNESS OF BREATH: 0
COUGH: 0
ORTHOPNEA: 0
PALPITATIONS: 0
PSYCHIATRIC NEGATIVE: 1
NAUSEA: 0

## 2025-07-30 ASSESSMENT — COGNITIVE AND FUNCTIONAL STATUS - GENERAL
HELP NEEDED FOR BATHING: A LITTLE
DAILY ACTIVITIY SCORE: 22
SUGGESTED CMS G CODE MODIFIER DAILY ACTIVITY: CJ
DRESSING REGULAR LOWER BODY CLOTHING: A LITTLE

## 2025-07-30 ASSESSMENT — FIBROSIS 4 INDEX: FIB4 SCORE: 1.51

## 2025-07-30 ASSESSMENT — ACTIVITIES OF DAILY LIVING (ADL): TOILETING: INDEPENDENT

## 2025-07-30 ASSESSMENT — PAIN DESCRIPTION - PAIN TYPE
TYPE: ACUTE PAIN
TYPE: ACUTE PAIN

## 2025-07-30 NOTE — PROGRESS NOTES
Bedside report received from off going RN/tech: Ananya, assumed care of patient.     Fall Risk Score: HIGH RISK  Fall risk interventions in place: Place yellow fall risk ID band on patient, Provide patient/family education based on risk assessment, Educate patient/family to call staff for assistance when getting out of bed, Place fall precaution signage outside patient door, Place patient in room close to nursing station, Utilize bed/chair fall alarm, and Bed alarm connected correctly  Bed type: Low air loss (Jefry Score less than 17 interventions in place)  Patient on cardiac monitor: Yes  IVF/IV medications: Infusion per MAR (List Med(s)) LR  Oxygen: Room Air  Bedside sitter: Not Applicable   Isolation: Not applicable

## 2025-07-30 NOTE — PROGRESS NOTES
Cardiology Follow-up Note    Name:   Anaid Aponte   YOB: 1945  Age:   79 y.o.  female   MRN:   [id number]        Attending Provider: Dr Jose Ragsdale M.D.     Chief Complaint: Syncope       Reason for cardiology consult: Syncope    Outpatient Cardiologist: None    History of Present Illness  Anaid Aponte is 79 y.o. female with prior medical history significant for hypertension, dyslipidemia, history of seizures on Keppra who was admitted on 7/28/2025 with syncope x 2.     Troponin 267, 225, 224, 227.    Interim Events 07/29/25   :  - Personal Telemetry interpretation: Sinus bradycardia in the 50s-sinus rhythm 60s with PVCs PACs  - Overnight events: No Cardiac events  Patient reported many loose stools overnight.  Reports chronic diarrhea for many months.  Additionally, reports severe fatigue  Patient denies chest pain, shortness of breath, edema, dizziness/lightheadedness, or palpitations.   - Labs reviewed: Troponin 267, 225, 224, 227      Interim Events 07/30/25   :  - Personal Telemetry interpretation: Sinus bradycardia in the 50s-70s with frequent PVCs, PACs and 10 beats of accelerated idio.   - Underwent stress test, negative  Patient denies chest pain, shortness of breath, edema, dizziness/lightheadedness, or palpitations.   - Vitals: 180/73  - Labs reviewed: Potassium 3.2, magnesium 1.6  - I/O's: 1.3 L at  - Weight: 160 pounds             Review of Systems   Constitutional:  Positive for malaise/fatigue.   Respiratory:  Negative for cough and shortness of breath.    Cardiovascular:  Negative for chest pain, palpitations, orthopnea and leg swelling.   Gastrointestinal:  Positive for diarrhea. Negative for abdominal pain, nausea and vomiting.   Neurological:  Negative for dizziness.   Psychiatric/Behavioral: Negative.          Medical History  Past Medical History[1]      Family History   Problem Relation Age of Onset    No Known Problems Mother     Heart Disease Father     Hyperlipidemia  "Father     No Known Problems Brother          Social History[2]      Allergies[3]      Medications   Scheduled Medications[4]        Physical Exam    Body mass index is 27.59 kg/m².     BP (!) 180/73   Pulse 74   Temp 36.5 °C (97.7 °F) (Temporal)   Resp 18   Ht 1.626 m (5' 4\")   Wt 72.9 kg (160 lb 11.5 oz)   SpO2 95%      Vitals:    07/29/25 2007 07/29/25 2319 07/30/25 0337 07/30/25 0719   BP: (!) 178/65 (!) 162/61 97/66 (!) 180/73   Pulse:  70 65 74   Resp:  18 18 18   Temp:  36 °C (96.8 °F) 36.1 °C (97 °F) 36.5 °C (97.7 °F)   TempSrc:  Temporal Temporal Temporal   SpO2:  97% 95% 95%   Weight:   72.9 kg (160 lb 11.5 oz)    Height:            Oxygen Therapy:  Pulse Oximetry: 95 %, O2 (LPM): 0, O2 Delivery Device: None - Room Air      Physical Exam  Constitutional:       Appearance: Normal appearance.   Cardiovascular:      Rate and Rhythm: Regular rhythm. Bradycardia present.      Heart sounds: No murmur heard.     No gallop.   Pulmonary:      Breath sounds: No wheezing or rales.   Abdominal:      General: There is no distension.      Palpations: Abdomen is soft.   Musculoskeletal:      Right lower leg: No edema.      Left lower leg: No edema.   Skin:     General: Skin is warm and dry.   Neurological:      Mental Status: She is alert and oriented to person, place, and time.   Psychiatric:         Mood and Affect: Mood normal.         Behavior: Behavior normal.         Thought Content: Thought content normal.         Judgment: Judgment normal.               Labs (personally reviewed):     Estimated Creatinine Clearance: 64.7 mL/min (by C-G formula based on SCr of 0.69 mg/dL).    No results found for: \"BNPBTYPENAT\"  Recent Labs     07/28/25  1115 07/28/25  1356 07/29/25  1111 07/30/25  0032   CREATININE 1.22  --  0.91 0.69   BUN 29*  --  26* 22   POTASSIUM 3.5*  --  4.2 3.2*   SODIUM 136  --  136 138   CALCIUM 9.2  --  9.4 8.9   MAGNESIUM  --  1.7 1.6  --    CO2 23  --  25 22   ALBUMIN 2.9*  --   --  3.3 "     Recent Labs     07/28/25  1115 07/29/25  1111 07/30/25  0032   GLUCOSE 137* 118* 86     Recent Labs     07/28/25  1115 07/30/25  0032   ASTSGOT 44 30   ALTSGPT 22 26   ALKPHOSPHAT 62 74     Recent Labs     07/28/25  1115 07/30/25  0032   WBC 7.4 9.6   HEMOGLOBIN 12.9 14.1   PLATELETCT 268 308     Recent Labs     07/28/25  1115 07/28/25  1356 07/28/25  1716 07/28/25  2208   TROPONINT 267* 225* 224* 227*   NTPROBNP 495*  --   --   --      Lab Results   Component Value Date/Time    CHOLSTRLTOT 195 07/21/2025 04:00 PM     (H) 07/21/2025 04:00 PM    HDL 43 07/21/2025 04:00 PM    TRIGLYCERIDE 203 (H) 07/21/2025 04:00 PM       Imaging:  NM-CARDIAC STRESS TEST   Final Result      EC-ECHOCARDIOGRAM COMPLETE W/O CONT   Final Result      DX-CHEST-PORTABLE (1 VIEW)   Final Result      Bibasilar atelectasis versus infiltrate with possible small effusions.      MR-BRAIN-WITH & W/O    (Results Pending)       Cardiac Imaging and Procedures Review        ECHOCARDIOGRAM 7/20/2025:  No prior study is available for comparison.      Mild concentric left ventricular hypertrophy.  Normal left ventricular systolic function.  The left ventricular ejection fraction is visually estimated to be 65%.  Normal right ventricular size and systolic function.  Moderate mitral annular calcification.  Mild mitral regurgitation.    STRESS TEST 7/29/2025:   Myocardial Perfusion   Report   NUCLEAR IMAGING INTERPRETATION   No evidence of significant jeopardized viable myocardium or prior myocardial    infarction.   Normal left ventricular size, ejection fraction, and wall motion.      Assessment and Medical Decision Making:    Syncope and collapse x 2  Elevated troponin  Sinus bradycardia  Positive orthostatic blood pressure  Autonomic dysfunction  Diarrhea  Hypertension      Recommendations:    Syncope  Hypertension  Autonomic dysfunction  2 episodes of syncope and collapse.  History of a mechanical fall in April.  Patient is noted to be have  sinus bradycardia.  She is on Coreg at home for high blood pressure.  No lethal arrhythmias noted on telemetry.  MRI brain is negative  - Near syncope during the orthostatic BP check on 7/29/2025.  Patient reported severe dizziness and started falling backwards after the initial position change from lying to standing. She was assisted to bed by bedside RN.  Was not able to complete the orthostatic BP test.  's upon me checking on patient a couple minutes after.  She was given NS bolus yesterday  -Primary team initiated maintenance IV fluids  Patient appears to have an autonomic dysfunction.  Has intermittent spikes of blood pressure in the 200s.  -Recommend rechecking her blood pressure in 10-15 minutes if her BP spikes occurr during rest and she is asymptomatic.  -Recommend hydralazine 10 mg p.o. as needed for persistent hypertension episodes over  with rest  - Decrease Coreg further to 3.125 mg twice daily due to bradycardia  -Continue losartan 50 mg daily  -Continue telemetry monitoring    Diarrhea  -Patient reports constant daily diarrhea and fatigue for 5-6 months.  She takes Imodium at home to help with diarrhea.  Patient tells me she has never had a workup for it besides a colonoscopy 1 time in her life.  She also reports drinking no more than about 500 mL of water per day.  Highly suspicious of dehydration as the etiology of her syncope.  -Notified the primary team of report of chronic diarrhea.  Recommend the GI workup.  -Continue optimizing electrolytes to keep potassium above 4 and magnesium above 2  -Check BMP and magnesium level    Elevated troponin  - Stress test is negative for ischemia.  -Continue aspirin 81 mg daily, atorvastatin 40 mg daily, ezetimibe 10 mg daily  -Discontinue hydrochlorothiazide to avoid further sources of the dehydration.  -Continue losartan 50 mg daily            Future Appointments   Date Time Provider Department Center   8/6/2025  1:40 PM Lani Birch M.D.  [facility] None   8/21/2025  1:40 PM AILEEN Cifuentes [facility]   9/30/2025  2:00 PM [facility]          Please contact me with any questions.  Thank you for allowing us to participate in the care of Ms. Aponte.        AILEEN Dorman  [facility]  [phone]      Please see Dr. To  attestation for further details and MDM. I, AILEEN Dorman performed a portion of the service face-to-face with the same patient on the same date of service independently of Dr. To FOR 15 minutes preparing to see the patient, reviewing hospital notes and tests, obtaining history from the patient, performing a medically appropriate exam, counseling and educating the patient, ordering medications/tests/procedures/referrals as clinically indicated, and documenting information in the electronic medical record.    Please note this dictation was created using voice recognition software.  I have made every reasonable attempt to correct obvious errors, but there may be errors of grammar and possibly content that I did not discover before finalizing the note.           [1]   Past Medical History:  Diagnosis Date    COPD (chronic obstructive pulmonary disease) (HCC) 07/29/2024    COPD, flonase and doing breo  Recent PFT    Diverticulitis     Encephalitis 07/29/2024    Lead to grand mal, seen by neurology  Taking Keppra    Hyperlipidemia 07/29/2024    Taking: Zetia    Polyarthritis rheumatica (HCC) 07/29/2024    Taking: prednisone 2.5 mg was seen by rheumatology  Pain generalized, worse when off the steroids.  On Prednisone 2.5 mg for the last 8 months    Primary hypertension 07/29/2024    Taking: Carvedilol, HCTZ, losartan  History of ow HR  Was following [facility] cardiology    Thyroid nodule    [2]   Social History  Tobacco Use    Smoking status: Former     Average packs/day: 0.5 packs/day for 15.4 years (7.7 ttl pk-yrs)     Types: Cigarettes  "    Start date: 7/29/1979    Smokeless tobacco: Never   Vaping Use    Vaping status: Never Used   Substance Use Topics    Alcohol use: Not Currently    Drug use: Not Currently   [3]   Allergies  Allergen Reactions    Codeine Vomiting and Nausea          Penicillins Vomiting and Nausea    Skin Adhesives Rash     \"Tears skin apart\"   [4]   Scheduled Medications   Medication Dose Frequency    losartan  50 mg QHS    [Held by provider] heparin  5,000 Units Q8HRS    senna-docusate  2 Tablet Q EVENING    aspirin  81 mg DAILY    [Held by provider] carvedilol  6.25 mg BID    ezetimibe  10 mg DAILY    lidocaine  1 Patch DAILY    levETIRAcetam  500 mg BID    umeclidinium-vilanterol  1 Puff DAILY    prednisONE  2.5 mg DAILY    [Held by provider] hydroCHLOROthiazide  25 mg DAILY    atorvastatin  40 mg Q EVENING     "

## 2025-07-30 NOTE — CARE PLAN
Problem: Knowledge Deficit - Standard  Goal: Patient and family/care givers will demonstrate understanding of plan of care, disease process/condition, diagnostic tests and medications  Description: Target End Date:  1-3 days or as soon as patient condition allows    Document in Patient Education    1.  Patient and family/caregiver oriented to unit, equipment, visitation policy and means for communicating concern  2.  Complete/review Learning Assessment  3.  Assess knowledge level of disease process/condition, treatment plan, diagnostic tests and medications  4.  Explain disease process/condition, treatment plan, diagnostic tests and medications  Outcome: Progressing     Problem: Skin Integrity  Goal: Skin integrity is maintained or improved  Description: Target End Date:  Prior to discharge or change in level of care    Document interventions on Skin Risk/Jefry flowsheet groups and corresponding LDA    1.  Assess and monitor skin integrity, appearance and/or temperature  2.  Assess risk factors for impaired skin integrity and/or pressures ulcers  3.  Implement precautions to protect skin integrity in collaboration with interdisciplinary team  4.  Implement pressure ulcer prevention protocol if at risk for skin breakdown  5.  Confirm wound care consult if at risk for skin breakdown  6.  Ensure patient use of pressure relieving devices  (Low air loss bed, waffle overlay, heel protectors, ROHO cushion, etc)  Outcome: Progressing     Problem: Fall Risk  Goal: Patient will remain free from falls  Description: Target End Date:  Prior to discharge or change in level of care    Document interventions on the Marycarmen Roberts Fall Risk Assessment    1.  Assess for fall risk factors  2.  Implement fall precautions  Outcome: Progressing     Problem: Pain - Standard  Goal: Alleviation of pain or a reduction in pain to the patient’s comfort goal  Description: Target End Date:  Prior to discharge or change in level of  care    Document on Vitals flowsheet    1.  Document pain using the appropriate pain scale per order or unit policy  2.  Educate and implement non-pharmacologic comfort measures (i.e. relaxation, distraction, massage, cold/heat therapy, etc.)  3.  Pain management medications as ordered  4.  Reassess pain after pain med administration per policy  5.  If opiods administered assess patient's response to pain medication is appropriate per POSS sedation scale  6.  Follow pain management plan developed in collaboration with patient and interdisciplinary team (including palliative care or pain specialists if applicable)  Outcome: Progressing   The patient is Watcher - Medium risk of patient condition declining or worsening    Shift Goals  Clinical Goals: MRI, manage BP  Patient Goals: MRI  Family Goals: updates    Progress made toward(s) clinical / shift goals:      Patient is not progressing towards the following goals:

## 2025-07-30 NOTE — THERAPY
"Occupational Therapy   Initial Evaluation     Patient Name:  Anaid Aponte  Age:  79 y.o., Sex:  female  Medical Record #:  [id number]  Today's Date:  7/30/2025     Precautions  Medical: Fall Risk  Comments: HTN    Assessment    Patient is 79 y.o. female admitted following syncopal episode in the setting of orthostatic hypotension and dehydration, pmhx includes HTN, DLD, seizures. Pt presents to OT jostin reporting feeling much improved from admit.  Pt presents to OT jostin able to demonstrate self-care ADL tasks and household distance mobility near her baseline of independence. Pt encouraged to use FWW for mobility but reports she often just \"reaches for the walls and things\" instead. Pt reports great social support from her daughter and son in law, she lives in their in-law unit. Anticipate DC home with home health OT once medically cleared.       Plan    Occupational Therapy Initial Treatment Plan   Treatment Interventions: Self Care / Activities of Daily Living, Adaptive Equipment, Therapeutic Exercises, Therapeutic Activity  Treatment Frequency: 3 Times per Week  Duration: Until Therapy Goals Met    DC Equipment Recommendations: Sock Aide  Discharge Recommendations: Recommend home health for continued occupational therapy services      Objective       07/30/25 1418   Prior Living Situation   Prior Services None   Housing / Facility 2 Story House   Steps Into Home 0   Bathroom Set up Walk In Shower;Shower Chair   Equipment Owned Grab Bar(s) In Tub / Shower;Tub / Shower Seat;Front-Wheel Walker;Single Point Cane   Lives with - Patient's Self Care Capacity Adult Children   Comments lives in the in law suite of her daughter's home with daughter and LOLITA   Prior Level of ADL Function   Self Feeding Independent   Grooming / Hygiene Independent   Bathing Independent   Dressing Independent   Toileting Independent   Prior Level of IADL Function   Medication Management Independent   Laundry Independent   Kitchen Mobility " Independent   Finances Independent   Home Management Independent   Shopping Independent   Prior Level Of Mobility Independent Without Device in Home;Independent With Device in Community   Driving / Transportation Relatives / Others Provide Transportation   History of Falls   History of Falls Yes   Date of Last Fall   (reports x2 falls recently)   Precautions   Medical Fall Risk   Comments HTN   Vitals   Vitals Comments recent elevated BPs per RN, medicated appropriately   Cognition    Cognition / Consciousness X   Speech/ Communication Hard of Hearing   Level of Consciousness Alert   Comments pleasant and cooperative   Strength Upper Body   Upper Body Strength  WDL   Sensation Upper Body   Upper Extremity Sensation  WDL   Upper Body Muscle Tone   Upper Body Muscle Tone  WDL   Coordination Upper Body   Coordination WDL   Balance Assessment   Sitting Balance (Static) Good   Sitting Balance (Dynamic) Good   Standing Balance (Static) Fair   Standing Balance (Dynamic) Fair -   Weight Shift Sitting Good   Weight Shift Standing Fair   Comments no AD, reaches for walls/furniture at baseline   Bed Mobility    Supine to Sit Supervised   Sit to Supine Supervised   Scooting Supervised   ADL Assessment   Eating Independent   Grooming Standing;Supervision   Upper Body Dressing Supervision   Lower Body Dressing Minimal Assist   Toileting Supervision   How much help from another person does the patient currently need...   Putting on and taking off regular lower body clothing? 3   Bathing (including washing, rinsing, and drying)? 3   Toileting, which includes using a toilet, bedpan, or urinal? 4   Putting on and taking off regular upper body clothing? 4   Taking care of personal grooming such as brushing teeth? 4   Eating meals? 4   6 Clicks Daily Activity Score 22   Functional Mobility   Sit to Stand Supervised   Bed, Chair, Wheelchair Transfer Contact Guard Assist   Toilet Transfers Contact Guard Assist   Transfer Method Stand  Step   Mobility no AD, CGA for safety   Activity Tolerance   Sitting in Chair 5min toilet   Sitting Edge of Bed 10min   Standing 3min x2   Comments no deficits   Patient / Family Goals   Patient / Family Goal #1 home ASAP   Short Term Goals   Short Term Goal # 1 pt will complete functional transfers at SPV level   Short Term Goal # 2 pt will complete LB dress with AE PRN at SPV level   Education Group   Education Provided Role of Occupational Therapist;Activities of Daily Living;Pathology of bedrest;Adaptive Equipment;Home Safety;Transfers   Role of Occupational Therapist Patient Response Patient;Acceptance;Explanation;Verbal Demonstration   Home Safety Patient Response Patient;Acceptance;Explanation;Verbal Demonstration   Transfers Patient Response Patient;Acceptance;Explanation;Verbal Demonstration   ADL Patient Response Patient;Acceptance;Explanation;Verbal Demonstration   Adaptive Equipment Patient Response Patient;Acceptance;Explanation;Verbal Demonstration   Pathology of Bedrest Patient Response Patient;Acceptance;Explanation;Verbal Demonstration   Occupational Therapy Initial Treatment Plan    Treatment Interventions Self Care / Activities of Daily Living;Adaptive Equipment;Therapeutic Exercises;Therapeutic Activity   Treatment Frequency 3 Times per Week   Duration Until Therapy Goals Met   Problem List   Problem List Decreased Homemaking Skills;Decreased Active Daily Living Skills;Decreased Upper Extremity Strength Right;Decreased Upper Extremity Strength Left;Decreased Functional Mobility;Decreased Activity Tolerance;Impaired Postural Control / Balance   Anticipated Discharge Equipment and Recommendations   DC Equipment Recommendations Sock Aide   Discharge Recommendations Recommend home health for continued occupational therapy services   Interdisciplinary Plan of Care Collaboration   IDT Collaboration with  Nursing   Patient Position at End of Therapy Seated;Chair Alarm On;Call Light within Reach;Tray  Table within Reach;Phone within Reach

## 2025-07-30 NOTE — PROGRESS NOTES
[facility] PROGRESS NOTE         Attending: Jose Ragsdale M.d. M.D.  PATIENT: Anaid Aponte; [id number]; 1945    Hospital Day # Hospital Day: 3    ID: 79 y.o. 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      24 Hour Events: On 7/29 during the afternoon while orthostatic vitals were taken patient experienced significant lightheadedness while standing from seated position. Received 1x 500cc NS bolus with improvement. Started MIVF with LR at 83ml/hr. Patient had 1x episode of BP elevated at 219/79. Received prn Hydralazine 10mg with improvement in BP to 140s-160s/50s-70s.     Subjective: This morning, patient states she is largely feeling improved from admission. Has not had further lightheadedness/dizziness since yesterday afternoon. Denies any headache, vision change, shortness of breath, chest pain.     OBJECTIVE:  Temp:  [36 °C (96.8 °F)-36.5 °C (97.7 °F)] 36.5 °C (97.7 °F)  Pulse:  [48-88] 74  Resp:  [18] 18  BP: ()/(53-80) 145/53  SpO2:  [95 %-97 %] 95 %    Intake/Output Summary (Last 24 hours) at 7/29/2025 1221  Last data filed at 7/29/2025 1135  Gross per 24 hour   Intake 0 ml   Output 750 ml   Net -750 ml       PE:  Gen: No acute distress, resting comfortably in bed  HEENT: normocephalic, atraumatic, EOMI  Pulm: clear to auscultation bilaterally, no respiratory distress   Cardio: RRR, no M/R/G  Abdom: soft, nontender, nondistended.  Neuro: no focal deficits, able to ambulate with 4-post walker. CN testing intact. Strength and sensation intact in bilateral upper and lower extremities.     LABS:  Recent Labs     07/28/25  1115 07/30/25  0032   WBC 7.4 9.6   RBC 4.05* 4.41   HEMOGLOBIN 12.9 14.1   HEMATOCRIT 39.3 40.8   MCV 97.0 92.5   MCH 31.9 32.0   RDW 40.8 39.0  "  PLATELETCT 268 308   MPV 9.7 9.4   NEUTSPOLYS 68.90 60.30   LYMPHOCYTES 15.80* 24.50   MONOCYTES 10.70 10.70   EOSINOPHILS 3.00 3.40   BASOPHILS 1.10 0.80     Recent Labs     07/28/25  1115 07/28/25  1356 07/29/25  1111 07/30/25  0032   SODIUM 136  --  136 138   POTASSIUM 3.5*  --  4.2 3.2*   CHLORIDE 100  --  100 102   CO2 23  --  25 22   BUN 29*  --  26* 22   CREATININE 1.22  --  0.91 0.69   CALCIUM 9.2  --  9.4 8.9   MAGNESIUM  --  1.7 1.6  --    ALBUMIN 2.9*  --   --  3.3     Estimated GFR/CRCL = Estimated Creatinine Clearance: 64.7 mL/min (by C-G formula based on SCr of 0.69 mg/dL).  Recent Labs     07/28/25  1115 07/29/25  1111 07/30/25  0032   GLUCOSE 137* 118* 86     Recent Labs     07/28/25  1115 07/30/25  0032   ASTSGOT 44 30   ALTSGPT 22 26   TBILIRUBIN 0.6 0.8   ALKPHOSPHAT 62 74   GLOBULIN 2.1 2.2             No results for input(s): \"INR\", \"APTT\", \"FIBRINOGEN\" in the last 72 hours.    Invalid input(s): \"DIMER\"    MICROBIOLOGY:   No results found for: \"BLOODCULTU\", \"BLDCULT\", \"BCHOLD\"     IMAGING:   MR-BRAIN-WITH & W/O   Final Result      No acute process. Age-related volume loss and chronic microvascular ischemic changes.      NM-CARDIAC STRESS TEST   Final Result      EC-ECHOCARDIOGRAM COMPLETE W/O CONT   Final Result      DX-CHEST-PORTABLE (1 VIEW)   Final Result      Bibasilar atelectasis versus infiltrate with possible small effusions.          MEDS:  Current Facility-Administered Medications   Medication Last Admin    carvedilol (Coreg) tablet 3.125 mg 3.125 mg at 07/30/25 1330    hydrALAZINE (Apresoline) injection 10 mg      loperamide (Imodium) capsule 2 mg      lactated ringers infusion Paused at 07/30/25 0308    LORazepam (Ativan) tablet 0.25 mg 0.25 mg at 07/30/25 0936    aminophylline injection 100 mg      losartan (Cozaar) tablet 50 mg 50 mg at 07/29/25 2007    Respiratory Therapy Consult      [Held by provider] heparin injection 5,000 Units      acetaminophen (Tylenol) tablet 650 mg 650 " mg at 07/29/25 0832    senna-docusate (Pericolace Or Senokot S) 8.6-50 MG per tablet 2 Tablet      And    polyethylene glycol/lytes (Miralax) Packet 1 Packet      aspirin EC tablet 81 mg 81 mg at 07/30/25 0430    ezetimibe (Zetia) tablet 10 mg 10 mg at 07/30/25 0430    lidocaine (Asperflex) 4 % 1 Patch 1 Patch at 07/30/25 0429    levETIRAcetam (Keppra) tablet 500 mg 500 mg at 07/30/25 0430    umeclidinium-vilanterol (Anoro Ellipta) inhaler 1 Puff 1 Puff at 07/30/25 0429    ondansetron (Zofran ODT) dispertab 4 mg      predniSONE (Deltasone) tablet 2.5 mg 2.5 mg at 07/30/25 0430    albuterol (Proventil) 2.5mg/0.5ml nebulizer solution 2.5 mg      atorvastatin (Lipitor) tablet 40 mg 40 mg at 07/29/25 1647       PROBLEM LIST:  Problem Noted   Primary Hypertension 7/29/2024    Taking: Carvedilol, HCTZ, losartan  History of ow HR  Was following [facility] cardiology.  Diagnosed with CHF, but told resolved.     Unilateral weakness- resolved 7/29/2025   Syncope and Collapse 7/28/2025   Hypokalemia 7/28/2025   Convulsions, Unspecified Convulsion Type (Hcc) 3/18/2025    Abstracted by HOLLIS KAMINSKI on 02/04/2025:  Last billed R56.9 Unspecified convulsions on 07/29/2024  by NATTY MCKEON in [facility] 2     Copd (Chronic Obstructive Pulmonary Disease) (Hcc) 7/29/2024    COPD, flonase and doing breo  Recent PFT     Polyarthritis Rheumatica (Hcc) 7/29/2024    Taking: prednisone 2.5 mg was seen by rheumatology  Pain generalized, worse when off the steroids.  On Prednisone 2.5 mg for the last 8 months     Hyperlipidemia 7/29/2024    Taking: Zetia     Thyroid Nodule 7/29/2024       ASSESSMENT/PLAN: 79 y.o. 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    * Syncope and collapse- (present on admission)  Assessment & Plan  Patient with atypical  presentation of syncope, with prodromal symptoms of lightheadedness, nausea, fatigue, and also reportedly cyanotic lips during event. Has history of seizures occurring 3 years ago and has been stable on Keppra without breakthrough seizures since then. CBC and CMP overall wnl other than hypokalemia. Concern for cardiogenic syncope vs orthostatic hypotension vs possible vasovagal vs lower concern for seizure episode.    - Cardiology consulted in ED, appreciate recommendations:  Orthostatic vital signs were abnormal. Echocardiogram showed mild LVH, EF 65%, mild mitral regurgitation. Troponin decreased from 267-227.   -NM stress test 7/29 negative  -Recommend rechecking her blood pressure in 10-15 minutes if her BP spikes occurr during rest and she is asymptomatic.  -Recommend hydralazine 10 mg p.o. as needed for persistent hypertension episodes over  with rest  - Decrease Coreg further to 3.125 mg twice daily due to bradycardia   -Continue ASA 81 mg, home BP meds including losartan and HCTZ. Continue Ezetimibe.   -Atorvastatin 40mg daily started in-patient.  -vEEG negative for acute seizure-like activity  -Continue home Keppra  -MRI Brain negative for acute intracranial abnormality    Primary hypertension- (present on admission)  Assessment & Plan  Patient with fluctuating BP over the past few days inpatient. Received IV hydralazine 10mg on 7/29 in PM for SBP>200. Carvedilol held 7/29 in setting of bradycardia.   Per Cardiology recommendations:  -Recommend rechecking her blood pressure in 10-15 minutes if her BP spikes occurr during rest and she is asymptomatic.  -Recommend hydralazine 10 mg p.o. as needed for persistent hypertension episodes over  with rest  - Decrease Coreg further to 3.125 mg twice daily due to bradycardia  -Holding HCTZ due to orthostatic hypotension  -Continue home Losartan      Diarrhea in adult patient  Assessment & Plan  Patient reports a few month history of daily diarrhea,  sometimes experiencing 4 bouts per day. She regularly takes imodium. She has not been evaluated for this previously. Given patient's orthostatic hypotension, volume depletion due to GI losses is likely contributing to her symptoms of syncope, dizziness, lightheadedness.   - Stool calprotectin and culture ordered  - IV fluids  - Likely refer to patient to GI as an out-patient   - Imodium ordered as in-patient, low concern is of infectious etiology due to length of symptoms.     Hypokalemia  Assessment & Plan  Hypokalemia of 3.5 on admission. Repleted with 40mEq PO Kdur. Repeat this morning 3.2, gave additional 40mEq PO. Mg+ 1.7.   - Re-check BMP in AM    Convulsions, unspecified convulsion type (HCC)- (present on admission)  Assessment & Plan  Patient's daughter reports history of convulsive seizures 3 years ago when patient lived in [place]. 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 Started on Keppra 500mg BID at the time, has continued on same dosage since then without breakthrough seizures. Currently still follows remotely with her neurologist from [place] with plans to establish with neurology here in [place] in 9/2025.   - Continue home Keppra  - EGG was normal.  - Brain MRI showed no acute abnormalities    Thyroid nodule- (present on admission)  Assessment & Plan  7/21/25 TSH decreased at 0.131, T4 wnl at 1.07. Euthyroid pattern. Will continue to monitor.     Hyperlipidemia- (present on admission)  Assessment & Plan  Most recent lipid panel from 7/21/25 showed LDL cholesterol 111, HDL 43, Tgs 203, total cholesterol 193.   - Continue home Zetia  - Per cardiology recommendations, will add on statin at this time with atorvastatin 40mg daily    Polyarthritis rheumatica (HCC)- (present on admission)  Assessment & Plan  -Continue home prednisone 2.5mg daily. As patient is overall stable and able to take oral medications, will continue current dosage without stress dosing. If  patient appears to clinically worsen or is unable to take oral medications then will have low threshold for stress dose steroids with prednisone 10mg daily.     COPD (chronic obstructive pulmonary disease) ([facility])- (present on admission)  Assessment & Plan  Currently stable on room air. No home O2 requirement. Has home rescue albuterol which she has not needed to use recently as well as daily Anoro Ellipta inhaler. Was noted to have some mild expiratory wheezing bilaterally on exam but otherwise normal effort.   - RT consult, continuous pulse ox  - Continue home Anoro Ellipta  - Prn albuterol     Unilateral weakness- resolved  Assessment & Plan  On physical examination 7/29, the patient was noted to have weakness of the left upper and lower extremities. Onset is unclear. On exam today strength was intact bilaterally in upper and lower extremities.   - MRI brain ordered, completed 7/30/25 with no acute intracranial abnormalities      Core Measures:  Fluids: LR at 83 mL/h  Lines: PIV  Abx: None  Diet: Regular  PPX: SCDs/TEDs    #DISPOSITION  - In-patient for further evaluation of syncope and collapse     Magaly Dugan M.D.  PGY-3  [facility]

## 2025-07-30 NOTE — PROGRESS NOTES
Monitor Summary:    Rhythm: SB-SR  Rate: 52-72  Ectopy: (F) PAC, (F) PVC, coup, bigem, trigem, 10 beats accelerated idio  Measurement : .13/.08/.48

## 2025-07-30 NOTE — PROGRESS NOTES
Monitor Summary  Rhythm: Sinus Bradycardia  Rate: 40-50s  Ectopy: PVCs, PACs, Couplets  .13 / .10 / .50

## 2025-07-30 NOTE — THERAPY
Occupational Therapy Contact Note    Patient Name: Anaid Aponte  Age:  79 y.o., Sex:  female  Medical Record #: [id number]  Today's Date: 7/29/2025    OT orders received and eval attempted. Pt politely declined OOB porition of evaluation due to fatigue from recent activities. Pt was provided education on importance of OOB ADLs to reduce risk of deconditioning. Will re-attempt when appropriate/able.       Prior Living Situation   Prior Services Intermittent Physical Support for ADL Per Family   Housing / Facility 2 Story House  (Lives in an in-law suite)   Steps Into Home 0   Steps In Home   (Full flight, however, pt does not need to access.)   Bathroom Set up Walk In Shower;Shower Chair  (Suction cup grab bars)   Equipment Owned 4-Wheel Walker;Single Point Cane;Tub Transfer Bench   Lives with - Patient's Self Care Capacity Adult Children;Related Adult   Comments Pt currently resides in the in-law suite of her daughter's 2-story house. Pt stated that her daughter has the next 2 months off and can assist as needed.   Prior Level of ADL Function   Self Feeding Independent   Grooming / Hygiene Independent   Bathing Independent   Dressing Independent   Toileting Independent   Prior Level of IADL Function   Medication Management Independent   Laundry Independent   Kitchen Mobility Independent   Finances Independent   Home Management Independent   Shopping Independent   Prior Level Of Mobility Independent With Device in Community;Independent With Device in Home   Driving / Transportation Relatives / Others Provide Transportation   History of Falls   History of Falls Yes   Date of Last Fall   (Reported having 2 recent falls resulting in facial bruising and hitting her head.)   Education Group   Education Provided Role of Occupational Therapist;Pathology of bedrest   Role of Occupational Therapist Patient Response Patient;Acceptance;Explanation;Verbal Demonstration   Pathology of Bedrest Patient Response  Patient;Acceptance;Explanation;Verbal Demonstration

## 2025-07-30 NOTE — PROGRESS NOTES
BP on left arm 204/80  BP on right arm 212/79    MD Notified, hydralazine ordered    Repeat right /60  Repeat left /62

## 2025-07-31 ENCOUNTER — PHARMACY VISIT (OUTPATIENT)
Dept: PHARMACY | Facility: MEDICAL CENTER | Age: 80
End: 2025-07-31
Payer: COMMERCIAL

## 2025-07-31 PROBLEM — E87.6 HYPOKALEMIA: Status: RESOLVED | Noted: 2025-07-28 | Resolved: 2025-07-31

## 2025-07-31 PROBLEM — R53.1 UNILATERAL WEAKNESS: Status: RESOLVED | Noted: 2025-07-29 | Resolved: 2025-07-31

## 2025-07-31 PROCEDURE — RXMED WILLOW AMBULATORY MEDICATION CHARGE

## 2025-07-31 ASSESSMENT — COGNITIVE AND FUNCTIONAL STATUS - GENERAL
SUGGESTED CMS G CODE MODIFIER MOBILITY: CJ
MOVING TO AND FROM BED TO CHAIR: A LITTLE
WALKING IN HOSPITAL ROOM: A LITTLE
MOBILITY SCORE: 20
STANDING UP FROM CHAIR USING ARMS: A LITTLE
CLIMB 3 TO 5 STEPS WITH RAILING: A LITTLE

## 2025-07-31 ASSESSMENT — GAIT ASSESSMENTS
DEVIATION: BRADYKINETIC
ASSISTIVE DEVICE: FRONT WHEEL WALKER
DISTANCE (FEET): 175
GAIT LEVEL OF ASSIST: SUPERVISED

## 2025-07-31 ASSESSMENT — FIBROSIS 4 INDEX: FIB4 SCORE: 1.51

## 2025-07-31 NOTE — PROGRESS NOTES
Pt discharged to home with family. Pt transferred to discharge lounge for discharge processing. Pt was A&Ox4, VSS, on RA, fully clothed and stated she had all of her belongings with her. Pt's PIV and tele box were removed prior to leaving unit.

## 2025-07-31 NOTE — DISCHARGE SUMMARY
[facility] Discharge Summary    Attending: Dr. Jose Ragsdale  Senior Resident: Dr. Magaly Dugan  Intern:  Dr. Blanca Connelly  Contact Number: [phone]    CHIEF COMPLAINT ON ADMISSION  Chief Complaint   Patient presents with    Syncope       Reason for Admission  EMS     Admission Date  7/28/2025    CODE STATUS  Prior    HPI & HOSPITAL COURSE  Anaid Aponte was admitted on 7/28/2025 after a syncopal episode concerning for cardiogenic syncope in the setting of dehydration and orthostatic hypotension.     In ED, patient bradycardic in the 48-54 range. Labs significant for elevated troponin at 267 and BNP of 495, hypokalemia of 3.5. CXR showed bibasilar atelectasis vs infiltrate with possible small effusions. EKG shows sinus bradycardia with multiple PACs, no ST elevation, mild ST depressions in lateral leads. Cardiology consulted from ED with recommendation for admission, trending troponin, and echocardiogram.     #Syncope and collapse   Echocardiogram showed mild LVH, EF 65%, mild mitral regurgitation. NM stress test 7/29 negative. vEEG negative for acute seizure-like activity. MRI Brain negative for acute intracranial abnormality. Continued home Keppra pending Neurology appointment. Per cardiology started Hydralazine 10 mg p.o. as needed for persistent hypertension episodes over  with rest. Atorvastatin 40mg daily started in-patient. Decreased Coreg further to 3.125 mg twice daily due to bradycardia. Discontinued HCTZ per cardiology to avoid further sources of dehydration.     Therefore, she is discharged in good and stable condition to home with organized home healthcare and close outpatient follow-up.    The patient met 2-midnight criteria for an inpatient stay at the time of discharge.    Discharge Date  7/31/2025    Physical Exam on Day of Discharge  Physical Exam  Constitutional:       Appearance: Normal appearance.   HENT:      Head: Normocephalic and atraumatic.      Mouth/Throat:       Mouth: Mucous membranes are moist.      Pharynx: Oropharynx is clear. No oropharyngeal exudate or posterior oropharyngeal erythema.   Eyes:      Extraocular Movements: Extraocular movements intact.      Conjunctiva/sclera: Conjunctivae normal.      Pupils: Pupils are equal, round, and reactive to light.   Cardiovascular:      Rate and Rhythm: Normal rate and regular rhythm.      Pulses: Normal pulses.      Heart sounds: Normal heart sounds.   Pulmonary:      Effort: Pulmonary effort is normal.      Breath sounds: Normal breath sounds.   Abdominal:      General: Abdomen is flat. Bowel sounds are normal. There is no distension.      Palpations: Abdomen is soft.      Tenderness: There is no abdominal tenderness.   Musculoskeletal:      Right lower leg: No edema.      Left lower leg: No edema.   Neurological:      General: No focal deficit present.      Mental Status: She is alert and oriented to person, place, and time.     FOLLOW UP ITEMS POST DISCHARGE  - Home health PT/OT services ordered  - Discussed with patient importance of maintaining adequate hydration  - Discussed with patient and her daughter monitoring of home BP twice daily with BP cuff. Also discussed that, if patient's BP is consistently >140/90 at home, may consider increase of Losartan from 50mg daily back to home dosage of 100mg daily.   - Follow up with PCP within 1-2 weeks of discharge    DISCHARGE DIAGNOSES  Principal Problem:    Syncope and collapse (POA: Yes)  Active Problems:    Primary hypertension (POA: Yes)      Overview: Taking: Carvedilol, HCTZ, losartan      History of ow HR      Was following [facility] cardiology.      Diagnosed with CHF, but told resolved.    COPD (chronic obstructive pulmonary disease) (HCC) (POA: Yes)      Overview: COPD, flonase and doing breo      Recent PFT    Polyarthritis rheumatica (HCC) (POA: Yes)      Overview: Taking: prednisone 2.5 mg was seen by rheumatology      Pain generalized, worse when off the steroids.       On Prednisone 2.5 mg for the last 8 months    Hyperlipidemia (POA: Yes)      Overview: Taking: Zetia    Thyroid nodule (POA: Yes)    Convulsions, unspecified convulsion type (HCC) (POA: Yes)      Overview: Abstracted by HOLLIS KAMINSKI on 02/04/2025:  Last billed R56.9       Unspecified convulsions on 07/29/2024  by NATTY MCKEON in [facility]       PA[facility]ILION 2    Diarrhea in adult patient (POA: Unknown)  Resolved Problems:    Hypokalemia (POA: Unknown)    Unilateral weakness- resolved (POA: Unknown)      FOLLOW UP  Future Appointments   Date Time Provider Department Center   8/6/2025  1:40 PM [facility]   8/7/2025  2:00 PM YAEL CifuentesRS[facility]   8/21/2025  1:40 PM YAEL CifuentesRSAM[facility]   9/30/2025  2:00 PM Joshua Whitley M.D. [facility]  [address] [id number]  [facility] [address]  [phone]          MEDICATIONS ON DISCHARGE     Medication List        START taking these medications        Instructions   atorvastatin 40 MG Tabs  Commonly known as: Lipitor   Take 1 Tablet by mouth every evening.  Dose: 40 mg            CHANGE how you take these medications        Instructions   carvedilol 3.125 MG Tabs  What changed:   medication strength  how much to take  when to take this  Commonly known as: Coreg   Take 1 Tablet by mouth 2 times a day with meals for 60 days.  Dose: 3.125 mg     losartan 50 MG Tabs  What changed:   medication strength  how much to take  Commonly known as: Cozaar   Take 1 Tablet by mouth at bedtime.  Dose: 50 mg     prednisONE 2.5 MG Tabs  What changed:   how much to take  how to take this  when to take this  Commonly known as: Deltasone   Doctor's comments:    Take ONE tablet (2.5 MG total) by MOUTH daily. Take with food            CONTINUE taking these medications        Instructions   ARNICARE ARNICA EX   Apply 1 Application topically as needed (pain/ swelling).  Dose: 1  Application     aspirin 81 MG EC tablet   Take 81 mg by mouth every day.  Dose: 81 mg     DEEP BLUE RELIEF EX   Apply 1 Application topically as needed (dry skin).  Dose: 1 Application     ezetimibe 10 MG Tabs  Commonly known as: Zetia   Take 1 Tablet by mouth every day.  Dose: 10 mg     FIBER GUMMIES PO   Take 1 Each by mouth every day.  Dose: 1 Each     ibuprofen 200 MG Tabs  Commonly known as: Motrin   Take 400 mg by mouth 1 time a day as needed for Mild Pain. 2 tablets= 400mg  Dose: 400 mg     levETIRAcetam 500 MG Tabs  Commonly known as: Keppra   Take 1 Tablet by mouth 2 times a day.  Dose: 500 mg     ondansetron 4 MG Tbdp  Commonly known as: Zofran ODT   Take 1 Tablet by mouth every 6 hours as needed for Nausea/Vomiting.  Dose: 4 mg     PROBIOTIC PO   Take 1 Capsule by mouth every day.  Dose: 1 Capsule     umeclidinium-vilanterol 62.5-25 MCG/ACT Aepb inhaler  Commonly known as: Anoro Ellipta   Inhale 1 Puff every day.  Dose: 1 Puff            STOP taking these medications      doxycycline 100 MG Tabs  Commonly known as: Vibramycin     hydroCHLOROthiazide 25 MG Tabs     lidocaine 5 % Ptch  Commonly known as: Lidoderm              Allergies  Allergies[1]    DIET  No orders of the defined types were placed in this encounter.      ACTIVITY  As tolerated and directed by skilled nursing.  Weight bearing as tolerated    CONSULTATIONS  Cardiology    PROCEDURES  NM Stress Test    LABORATORY  Lab Results   Component Value Date    SODIUM 134 (L) 07/31/2025    POTASSIUM 3.8 07/31/2025    CHLORIDE 100 07/31/2025    CO2 22 07/31/2025    GLUCOSE 120 (H) 07/31/2025    BUN 23 (H) 07/31/2025    CREATININE 0.65 07/31/2025        Lab Results   Component Value Date    WBC 9.6 07/30/2025    HEMOGLOBIN 14.1 07/30/2025    HEMATOCRIT 40.8 07/30/2025    PLATELETCT 308 07/30/2025        Total time of the discharge process exceeds 30 minutes.    Magaly Dugan M.D.  PGY-3  [facility]       [1]  "  Allergies  Allergen Reactions    Codeine Vomiting and Nausea          Penicillins Vomiting and Nausea    Skin Adhesives Rash     \"Tears skin apart\"     "

## 2025-07-31 NOTE — DISCHARGE PLANNING
Case Management Discharge Planning    Admission Date: 7/28/2025  GMLOS: 2.3  ALOS: 3    6-Clicks ADL Score: 22  6-Clicks Mobility Score: 17  PT and/or OT Eval ordered: Yes  PT/OT:Recommending HH  Post-acute Referrals Ordered: Yes  Post-acute Choice Obtained: Yes  Has referral(s) been sent to post-acute provider:  Yes      Anticipated Discharge Dispo: Discharge Disposition: D/T to home under HHA care in anticipation of covered skilled care (06)  Discharge Address: [address] [place] [address] [address] [place] [address]  Discharge Contact Phone Number: [phone]    DME Needed: No    Action(s) Taken: LMSW received HH order. LMSW rounded with pt to receive choice. Due to pt's insurance SCP choice for 1 [facility], 2 [facility], 3 [facility]. Faxed choice form to DPA.     Escalations Completed: None    Medically Clear: Yes

## 2025-07-31 NOTE — PROGRESS NOTES
Report received from night RN at 0700. PT seen at bedside and pt care assumed. Pt is A&Ox4, on RA, denies pain. PT is SR on telemetry. PT is x1 assist.     Plan of care reviewed with pt, call light, phone, and personal belongings within reach. Bed in low locked position. All pt's needs met at this time.    Bedside report received from off going RN/tech: Ira RN, assumed care of patient.     Fall Risk Score: HIGH RISK  Fall risk interventions in place: Place yellow fall risk ID band on patient, Provide patient/family education based on risk assessment, Educate patient/family to call staff for assistance when getting out of bed, Place fall precaution signage outside patient door, Utilize bed/chair fall alarm, Notify charge of high risk for huddle, and Bed alarm connected correctly  Bed type: Regular (Jefry Score less than 17 interventions in place)  Patient on cardiac monitor: Yes  IVF/IV medications: Not Applicable   Oxygen: Room Air and No oxygen tank in room  Bedside sitter: Not Applicable   Isolation: Not applicable

## 2025-07-31 NOTE — DISCHARGE PLANNING
ATTN: Case Management  RE: Referral for Home Health    As of 07.31.2025, we have accepted the Home Health referral for the patient listed above.    [facility]  will contact the patient within 48 hours. If you have any questions or concerns regarding the patient’s transition to Home Health, please do not hesitate to contact us at [phone].      We look forward to collaborating with you,  [facility] Team

## 2025-07-31 NOTE — CARE PLAN
The patient is Watcher - Medium risk of patient condition declining or worsening    Shift Goals  Clinical Goals: manage BP, monitor labs  Patient Goals: sleep  Family Goals: neha    Progress made toward(s) clinical / shift goals:  Pt calls appropriately, complains of no pain, turns self in dependently in bed.      Problem: Skin Integrity  Goal: Skin integrity is maintained or improved  Outcome: Progressing

## 2025-07-31 NOTE — THERAPY
"Physical Therapy   Discharge     Patient Name:  Anaid Aponte  Age:  79 y.o., Sex:  female  Medical Record #:  [id number]  Today's Date:  7/31/2025     Precautions  Medical: Fall Risk    Assessment    Pt received in a chair at bedside and agreeable to PT session. Pt walked 175ft with FWW and supervision. Pt reports feeling better than when she was admitted, but not yet back to baseline. Recommend HHPT to continue to progress. No further acute PT needs at this time.    Plan    Reason for Discharge From Therapy: Discharge Secondary to Goals Met    DC Equipment Recommendations: None  Discharge Recommendations: Recommend home health for continued physical therapy services      Subjective    \"I can't wait to get home\"     Objective       07/31/25 1053   Precautions   Medical Fall Risk   Vitals   O2 (LPM) 0   O2 Delivery Device None - Room Air   Pain 0 - 10 Group   Therapist Pain Assessment Post Activity Pain Same as Prior to Activity;Nurse Notified;0   Cognition    Level of Consciousness Alert   Comments Pleasand and cooperative, excited for return home   Balance   Sitting Balance (Static) Good   Sitting Balance (Dynamic) Good   Standing Balance (Static) Fair   Standing Balance (Dynamic) Fair   Weight Shift Sitting Good   Weight Shift Standing Good   Skilled Intervention Verbal Cuing   Comments FWW in standing, normally uses a 4WW   Bed Mobility    Sit to Supine Supervised   Scooting Supervised   Skilled Intervention Verbal Cuing   Comments HOB elevated   Gait Analysis   Gait Level Of Assist Supervised   Assistive Device Front Wheel Walker   Distance (Feet) 175   # of Times Distance was Traveled 1   Deviation Bradykinetic   Skilled Intervention Verbal Cuing   Comments Improved tolerance and gait speed compared to initial evaluation   Functional Mobility   Sit to Stand Supervised   Bed, Chair, Wheelchair Transfer Supervised   Mobility up with FWW   Skilled Intervention Verbal Cuing   6 Clicks Assessment - How much HELP from " from another person do you currently need... (If the patient hasn't done an activity recently, how much help from another person do you think he/she would need if he/she tried?)   Turning from your back to your side while in a flat bed without using bedrails? 4   Moving from lying on your back to sitting on the side of a flat bed without using bedrails? 4   Moving to and from a bed to a chair (including a wheelchair)? 3   Standing up from a chair using your arms (e.g., wheelchair, or bedside chair)? 3   Walking in hospital room? 3   Climbing 3-5 steps with a railing? 3   6 clicks Mobility Score 20   Short Term Goals    Short Term Goal # 1 Pt will complete bed mobility with supervision from a flat bed to progress function in 6 visits.   Goal Outcome # 1 Goal met   Short Term Goal # 2 Pt will transfer with FWW and supervision to progress function in 6 visits.   Goal Outcome # 2 Goal met   Short Term Goal # 3 Pt will ambulate 150ft with FWW and supervision to progress function in 6 visits.   Goal Outcome # 3 Goal met   Physical Therapy Treatment Plan   Reason For Discharge Discharge Secondary to Goals Met   Anticipated Discharge Equipment and Recommendations   DC Equipment Recommendations None   Discharge Recommendations Recommend home health for continued physical therapy services   Interdisciplinary Plan of Care Collaboration   IDT Collaboration with  Nursing   Patient Position at End of Therapy In Bed;Call Light within Reach;Tray Table within Reach;Phone within Reach   Collaboration Comments RN updated   Session Information   Date / Session Number  7/31-2, goals met

## 2025-07-31 NOTE — PROGRESS NOTES
Bedside report received from off going RN/tech: Ananya assumed care of patient.     Fall Risk Score: HIGH RISK  Fall risk interventions in place: Place yellow fall risk ID band on patient, Provide patient/family education based on risk assessment, Educate patient/family to call staff for assistance when getting out of bed, Place fall precaution signage outside patient door, Place patient in room close to nursing station, and Utilize bed/chair fall alarm  Bed type: Regular (Jefry Score less than 17 interventions in place)  Patient on cardiac monitor: Yes  IVF/IV medications: Not Applicable   Oxygen: Room Air  Bedside sitter: Not Applicable   Isolation: Not applicable

## 2025-07-31 NOTE — PROGRESS NOTES
Monitor Summary:   Rhythm: SR-SB  Rate: 53-81  Measurement: .12/.08/.49  Ectopy: r pvc, r pac r coup

## 2025-07-31 NOTE — HOSPITAL COURSE
Anaid Aponte was admitted on 7/28/2025 after a syncopal episode concerning for cardiogenic syncope in the setting of dehydration and orthostatic hypotension.     In ED, patient bradycardic in the 48-54 range. Labs significant for elevated troponin at 267 and BNP of 495, hypokalemia of 3.5. CXR showed bibasilar atelectasis vs infiltrate with possible small effusions. EKG shows sinus bradycardia with multiple PACs, no ST elevation, mild ST depressions in lateral leads. Cardiology consulted from ED with recommendation for admission, trending troponin, and echocardiogram.     #Syncope and collapse   Echocardiogram showed mild LVH, EF 65%, mild mitral regurgitation.NM stress test 7/29 negative. vEEG negative for acute seizure-like activity. MRI Brain negative for acute intracranial abnormality. Continue home Keppra pending Neurology appointment. Per cardiology started Hydralazine 10 mg p.o. as needed for persistent hypertension episodes over  with rest. Atorvastatin 40mg daily started in-patient. Decreased Coreg further to 3.125 mg twice daily due to bradycardia. Discontinue HCTZ per cardiology to avoid further sources of dehydration.     *** magnesium and potassium repleted

## 2025-08-01 ENCOUNTER — TELEPHONE (OUTPATIENT)
Dept: HOME HEALTH SERVICES | Facility: HOME HEALTHCARE | Age: 80
End: 2025-08-01
Payer: MEDICARE

## 2025-08-01 ENCOUNTER — DOCUMENTATION (OUTPATIENT)
Dept: HEALTH INFORMATION MANAGEMENT | Facility: OTHER | Age: 80
End: 2025-08-01
Payer: MEDICARE

## 2025-08-01 ENCOUNTER — TELEPHONE (OUTPATIENT)
Dept: PALLIATIVE MEDICINE | Facility: HOSPICE | Age: 80
End: 2025-08-01
Payer: MEDICARE

## 2025-08-02 ENCOUNTER — TELEPHONE (OUTPATIENT)
Dept: HOME HEALTH SERVICES | Facility: HOME HEALTHCARE | Age: 80
End: 2025-08-02
Payer: MEDICARE

## 2025-08-04 ENCOUNTER — TELEPHONE (OUTPATIENT)
Dept: HOME HEALTH SERVICES | Facility: HOME HEALTHCARE | Age: 80
End: 2025-08-04
Payer: MEDICARE

## 2025-08-04 ENCOUNTER — APPOINTMENT (OUTPATIENT)
Dept: URBAN - METROPOLITAN AREA CLINIC 22 | Facility: CLINIC | Age: 80
Setting detail: DERMATOLOGY
End: 2025-08-04

## 2025-08-04 DIAGNOSIS — D22 MELANOCYTIC NEVI: ICD-10-CM

## 2025-08-04 DIAGNOSIS — L57.0 ACTINIC KERATOSIS: ICD-10-CM

## 2025-08-04 DIAGNOSIS — Z71.89 OTHER SPECIFIED COUNSELING: ICD-10-CM

## 2025-08-04 DIAGNOSIS — L81.4 OTHER MELANIN HYPERPIGMENTATION: ICD-10-CM

## 2025-08-04 PROBLEM — D48.5 NEOPLASM OF UNCERTAIN BEHAVIOR OF SKIN: Status: ACTIVE | Noted: 2025-08-04

## 2025-08-04 PROBLEM — D22.5 MELANOCYTIC NEVI OF TRUNK: Status: ACTIVE | Noted: 2025-08-04

## 2025-08-04 PROCEDURE — ? COUNSELING

## 2025-08-04 PROCEDURE — ? BIOPSY BY SHAVE METHOD

## 2025-08-04 PROCEDURE — ? SUNSCREEN RECOMMENDATIONS

## 2025-08-04 PROCEDURE — ? LIQUID NITROGEN

## 2025-08-04 ASSESSMENT — LOCATION DETAILED DESCRIPTION DERM
LOCATION DETAILED: RIGHT SUPERIOR UPPER BACK
LOCATION DETAILED: LEFT MID-UPPER BACK
LOCATION DETAILED: RIGHT INFERIOR UPPER BACK
LOCATION DETAILED: RIGHT CLAVICULAR NECK

## 2025-08-04 ASSESSMENT — LOCATION ZONE DERM
LOCATION ZONE: TRUNK
LOCATION ZONE: NECK

## 2025-08-04 ASSESSMENT — LOCATION SIMPLE DESCRIPTION DERM
LOCATION SIMPLE: RIGHT UPPER BACK
LOCATION SIMPLE: LEFT UPPER BACK
LOCATION SIMPLE: RIGHT ANTERIOR NECK

## 2025-08-04 NOTE — DOCUMENTATION QUERY
[facility]                                                                       Query Response Note      PATIENT:               CEE CASTANON  ACCT #:                  [id number]  MRN:                     [id number]  :                      1945  ADMIT DATE:       2025 11:06 AM  DISCH DATE:        2025 2:06 PM  RESPONDING  PROVIDER #:        [id number]           QUERY TEXT:    Patient admitted for syncope and noted to have dehydration, HTN, orthostatic hypotension. Creatinine/GFR since arrival on : 1.22/45 -> 0.91/64 -> 0.69/88. Treatment on : IV  ml bolus x1; IV LR infusion @ 83 ml/hr x1.    Please clarify the clinical/diagnostic relevance for the documented findings.    Note: If you agree with a diagnosis listed, please remember to include it in your concurrent daily documentation and onto the Discharge Summary.    The patient's clinical indicators include:   &  Creatinine/GFR: 0.74/82 -> 0.88/66  Creatinine/GFR since arrival on : 1.22/45 -> 0.91/64 -> 0.69/88     Treatment: IV  ml bolus x1; IV LR infusion @ 83 ml/hr x1    Risk factors: Dehydration; HTN; Orthostatic hypotension    Thank you,  Jeannette Martinez BSN  Clinical   Connect via [facility]  Options provided:   -- Acute kidney injury is clinically significant and ruled in   -- Findings of no clinical significance   -- Other explanation, (Please specify the other explanation)      Query created by: Jeannette Martinez on 2025 2:10 PM    RESPONSE TEXT:    Acute kidney injury is clinically significant and ruled in          Electronically signed by:  REYNA JARRETT 2025 4:04 PM

## 2025-08-06 ENCOUNTER — TELEPHONE (OUTPATIENT)
Dept: HOME HEALTH SERVICES | Facility: HOME HEALTHCARE | Age: 80
End: 2025-08-06
Payer: MEDICARE

## 2025-08-06 ENCOUNTER — TELEPHONE (OUTPATIENT)
Dept: HEALTH INFORMATION MANAGEMENT | Facility: OTHER | Age: 80
End: 2025-08-06
Payer: MEDICARE

## 2025-08-06 ENCOUNTER — OFFICE VISIT (OUTPATIENT)
Dept: SLEEP MEDICINE | Facility: MEDICAL CENTER | Age: 80
End: 2025-08-06
Attending: FAMILY MEDICINE
Payer: MEDICARE

## 2025-08-06 VITALS
SYSTOLIC BLOOD PRESSURE: 130 MMHG | HEIGHT: 64 IN | BODY MASS INDEX: 26.98 KG/M2 | DIASTOLIC BLOOD PRESSURE: 76 MMHG | RESPIRATION RATE: 16 BRPM | HEART RATE: 59 BPM | WEIGHT: 158 LBS | OXYGEN SATURATION: 96 %

## 2025-08-06 DIAGNOSIS — R13.10 DYSPHAGIA, UNSPECIFIED TYPE: ICD-10-CM

## 2025-08-06 DIAGNOSIS — J44.9 CHRONIC OBSTRUCTIVE PULMONARY DISEASE, UNSPECIFIED COPD TYPE (HCC): ICD-10-CM

## 2025-08-06 DIAGNOSIS — R05.3 CHRONIC COUGH: Primary | ICD-10-CM

## 2025-08-06 PROCEDURE — 99205 OFFICE O/P NEW HI 60 MIN: CPT | Performed by: STUDENT IN AN ORGANIZED HEALTH CARE EDUCATION/TRAINING PROGRAM

## 2025-08-06 PROCEDURE — 99213 OFFICE O/P EST LOW 20 MIN: CPT | Performed by: FAMILY MEDICINE

## 2025-08-06 PROCEDURE — 3078F DIAST BP <80 MM HG: CPT | Performed by: STUDENT IN AN ORGANIZED HEALTH CARE EDUCATION/TRAINING PROGRAM

## 2025-08-06 PROCEDURE — 3075F SYST BP GE 130 - 139MM HG: CPT | Performed by: STUDENT IN AN ORGANIZED HEALTH CARE EDUCATION/TRAINING PROGRAM

## 2025-08-06 RX ORDER — ALBUTEROL SULFATE 90 UG/1
2 INHALANT RESPIRATORY (INHALATION) EVERY 6 HOURS PRN
Qty: 34 G | Refills: 1 | Status: SHIPPED | OUTPATIENT
Start: 2025-08-06

## 2025-08-06 ASSESSMENT — ENCOUNTER SYMPTOMS
NAUSEA: 0
FOCAL WEAKNESS: 0
SHORTNESS OF BREATH: 1
FEVER: 0
BACK PAIN: 1
MYALGIAS: 0
SPUTUM PRODUCTION: 0
EYE DISCHARGE: 0
WHEEZING: 0
COUGH: 1
DYSPNEA AT REST: 0
HEMOPTYSIS: 0
RESPIRATORY SYMPTOMS COMMENTS: YES
CHEST TIGHTNESS: 0
VOMITING: 0
CHILLS: 0
FALLS: 0

## 2025-08-06 ASSESSMENT — FIBROSIS 4 INDEX: FIB4 SCORE: 1.51

## 2025-08-07 ENCOUNTER — HOME CARE VISIT (OUTPATIENT)
Dept: HOME HEALTH SERVICES | Facility: HOME HEALTHCARE | Age: 80
End: 2025-08-07
Payer: MEDICARE

## 2025-08-07 ENCOUNTER — OFFICE VISIT (OUTPATIENT)
Dept: MEDICAL GROUP | Facility: MEDICAL CENTER | Age: 80
End: 2025-08-07
Payer: MEDICARE

## 2025-08-07 VITALS
SYSTOLIC BLOOD PRESSURE: 100 MMHG | DIASTOLIC BLOOD PRESSURE: 72 MMHG | BODY MASS INDEX: 27.25 KG/M2 | HEART RATE: 61 BPM | HEIGHT: 64 IN | TEMPERATURE: 97.9 F | OXYGEN SATURATION: 95 % | WEIGHT: 159.6 LBS

## 2025-08-07 DIAGNOSIS — Z09 HOSPITAL DISCHARGE FOLLOW-UP: ICD-10-CM

## 2025-08-07 DIAGNOSIS — R79.9 ELEVATED BUN: ICD-10-CM

## 2025-08-07 DIAGNOSIS — R55 SYNCOPE AND COLLAPSE: ICD-10-CM

## 2025-08-07 DIAGNOSIS — I10 PRIMARY HYPERTENSION: Primary | ICD-10-CM

## 2025-08-07 DIAGNOSIS — E87.1 HYPONATREMIA: ICD-10-CM

## 2025-08-07 PROCEDURE — 665999 HH PPS REVENUE DEBIT

## 2025-08-07 PROCEDURE — G0493 RN CARE EA 15 MIN HH/HOSPICE: HCPCS

## 2025-08-07 PROCEDURE — 665998 HH PPS REVENUE CREDIT

## 2025-08-07 PROCEDURE — 665001 SOC-HOME HEALTH

## 2025-08-07 ASSESSMENT — ENCOUNTER SYMPTOMS
VOMITING: DENIES
BOWEL PATTERN COMMENTS: SOFT FORMED
LAST BOWEL MOVEMENT: 67424
NAUSEA: DENIES
DENIES PAIN: 1

## 2025-08-07 ASSESSMENT — FIBROSIS 4 INDEX: FIB4 SCORE: 1.51

## 2025-08-08 ENCOUNTER — HOME CARE VISIT (OUTPATIENT)
Dept: HOME HEALTH SERVICES | Facility: HOME HEALTHCARE | Age: 80
End: 2025-08-08
Payer: MEDICARE

## 2025-08-08 VITALS
TEMPERATURE: 97.3 F | RESPIRATION RATE: 16 BRPM | DIASTOLIC BLOOD PRESSURE: 62 MMHG | OXYGEN SATURATION: 95 % | HEART RATE: 58 BPM | SYSTOLIC BLOOD PRESSURE: 122 MMHG

## 2025-08-08 PROCEDURE — 665998 HH PPS REVENUE CREDIT

## 2025-08-08 PROCEDURE — 665999 HH PPS REVENUE DEBIT

## 2025-08-09 PROCEDURE — 665999 HH PPS REVENUE DEBIT

## 2025-08-09 PROCEDURE — 665998 HH PPS REVENUE CREDIT

## 2025-08-09 ASSESSMENT — ENCOUNTER SYMPTOMS
FATIGUE: 1
FATIGUES EASILY: 1
HYPERTENSION: 1

## 2025-08-10 PROCEDURE — 665999 HH PPS REVENUE DEBIT

## 2025-08-10 PROCEDURE — 665998 HH PPS REVENUE CREDIT

## 2025-08-11 ENCOUNTER — HOME CARE VISIT (OUTPATIENT)
Dept: HOME HEALTH SERVICES | Facility: HOME HEALTHCARE | Age: 80
End: 2025-08-11
Payer: MEDICARE

## 2025-08-11 ENCOUNTER — DOCUMENTATION (OUTPATIENT)
Dept: MEDICAL GROUP | Facility: PHYSICIAN GROUP | Age: 80
End: 2025-08-11
Payer: MEDICARE

## 2025-08-11 VITALS
HEART RATE: 61 BPM | DIASTOLIC BLOOD PRESSURE: 62 MMHG | RESPIRATION RATE: 16 BRPM | TEMPERATURE: 97.7 F | OXYGEN SATURATION: 98 % | SYSTOLIC BLOOD PRESSURE: 125 MMHG

## 2025-08-11 PROCEDURE — 665998 HH PPS REVENUE CREDIT

## 2025-08-11 PROCEDURE — G0153 HHCP-SVS OF S/L PATH,EA 15MN: HCPCS

## 2025-08-11 PROCEDURE — G0299 HHS/HOSPICE OF RN EA 15 MIN: HCPCS

## 2025-08-11 PROCEDURE — 665999 HH PPS REVENUE DEBIT

## 2025-08-11 ASSESSMENT — ENCOUNTER SYMPTOMS
PAIN LOCATION - RELIEVING FACTORS: REST/MEDS
LOWEST PAIN SEVERITY IN PAST 24 HOURS: 0/10
LOWEST PAIN SEVERITY IN PAST 24 HOURS: 0/10
HIGHEST PAIN SEVERITY IN PAST 24 HOURS: 3/10
PAIN LOCATION - PAIN QUALITY: ACHY/SHARP
COUGH CHARACTERISTICS: NON-PRODUCTIVE
PAIN: 1
PAIN SEVERITY GOAL: 0/10
PAIN LOCATION - PAIN FREQUENCY: FREQUENT
PAIN LOCATION - PAIN SEVERITY: 3/10
PAIN LOCATION - PAIN QUALITY: ACHY
HIGHEST PAIN SEVERITY IN PAST 24 HOURS: 3/10
COUGH CHARACTERISTICS: DRY
PAIN LOCATION - PAIN SEVERITY: 3/10
CHANGE IN APPETITE: UNCHANGED
SUBJECTIVE PAIN PROGRESSION: UNCHANGED
PAIN LOCATION: LOWER BACK
PAIN LOCATION - EXACERBATING FACTORS: ACTIVITY
VOMITING: PT DENIES ANY EMESIS AT THIS TIME
SUBJECTIVE PAIN PROGRESSION: UNCHANGED
PAIN LOCATION - PAIN FREQUENCY: FREQUENT
PAIN LOCATION: BACK
PAIN LOCATION - EXACERBATING FACTORS: MOVEMENT
NAUSEA: PT DENIES ANY NAUSEA AT THIS TIME
LAST BOWEL MOVEMENT: 67428
APPETITE LEVEL: GOOD
PAIN SEVERITY GOAL: 3/10
COUGH: 1
STOOL FREQUENCY: DAILY
BOWEL PATTERN NORMAL: 1
PAIN: 1

## 2025-08-12 ENCOUNTER — OFF SITE VISIT (OUTPATIENT)
Dept: PALLIATIVE MEDICINE | Facility: HOSPICE | Age: 80
End: 2025-08-12
Payer: MEDICARE

## 2025-08-12 ENCOUNTER — HOME CARE VISIT (OUTPATIENT)
Dept: HOME HEALTH SERVICES | Facility: HOME HEALTHCARE | Age: 80
End: 2025-08-12
Payer: MEDICARE

## 2025-08-12 VITALS
RESPIRATION RATE: 16 BRPM | HEART RATE: 67 BPM | OXYGEN SATURATION: 94 % | DIASTOLIC BLOOD PRESSURE: 68 MMHG | SYSTOLIC BLOOD PRESSURE: 128 MMHG | TEMPERATURE: 97.7 F

## 2025-08-12 DIAGNOSIS — G89.29 CHRONIC LOW BACK PAIN, UNSPECIFIED BACK PAIN LATERALITY, UNSPECIFIED WHETHER SCIATICA PRESENT: ICD-10-CM

## 2025-08-12 DIAGNOSIS — R55 SYNCOPE AND COLLAPSE: Primary | ICD-10-CM

## 2025-08-12 DIAGNOSIS — M54.50 CHRONIC LOW BACK PAIN, UNSPECIFIED BACK PAIN LATERALITY, UNSPECIFIED WHETHER SCIATICA PRESENT: ICD-10-CM

## 2025-08-12 DIAGNOSIS — R56.9 CONVULSIONS, UNSPECIFIED CONVULSION TYPE (HCC): ICD-10-CM

## 2025-08-12 PROCEDURE — G0151 HHCP-SERV OF PT,EA 15 MIN: HCPCS

## 2025-08-12 PROCEDURE — 99349 HOME/RES VST EST MOD MDM 40: CPT | Performed by: NURSE PRACTITIONER

## 2025-08-12 PROCEDURE — 665998 HH PPS REVENUE CREDIT

## 2025-08-12 PROCEDURE — 665999 HH PPS REVENUE DEBIT

## 2025-08-12 ASSESSMENT — ENCOUNTER SYMPTOMS
ABDOMINAL PAIN: 0
DEBILITATING PAIN: 1
PALPITATIONS: 0
BLOOD IN STOOL: 0
CONSTIPATION: 0
CHILLS: 0
DIARRHEA: 0
BACK PAIN: 1
SEIZURES: 0
FEVER: 0
PAIN LOCATION - PAIN QUALITY: ACHY
PAIN SEVERITY GOAL: 0/10
SHORTNESS OF BREATH: 1
LOWEST PAIN SEVERITY IN PAST 24 HOURS: 0/10
PAIN LOCATION - PAIN FREQUENCY: INTERMITTENT
LIMITED RANGE OF MOTION: 1
DIZZINESS: 1
PAIN LOCATION: LOW BACK
PAIN LOCATION - PAIN SEVERITY: 6/10
MUSCLE WEAKNESS: 1
HIGHEST PAIN SEVERITY IN PAST 24 HOURS: 6/10
PAIN: 1
SUBJECTIVE PAIN PROGRESSION: UNCHANGED

## 2025-08-12 ASSESSMENT — ACTIVITIES OF DAILY LIVING (ADL)
AMBULATION_DISTANCE/DURATION_TOLERATED: 200 FT
AMBULATION ASSISTANCE: SUPERVISION
AMBULATION ASSISTANCE: 1
AMBULATION ASSISTANCE ON FLAT SURFACES: 1

## 2025-08-13 ENCOUNTER — HOME CARE VISIT (OUTPATIENT)
Dept: HOME HEALTH SERVICES | Facility: HOME HEALTHCARE | Age: 80
End: 2025-08-13
Payer: MEDICARE

## 2025-08-13 VITALS
HEART RATE: 61 BPM | TEMPERATURE: 97.7 F | OXYGEN SATURATION: 96 % | RESPIRATION RATE: 17 BRPM | SYSTOLIC BLOOD PRESSURE: 96 MMHG | DIASTOLIC BLOOD PRESSURE: 64 MMHG

## 2025-08-13 PROCEDURE — 665999 HH PPS REVENUE DEBIT

## 2025-08-13 PROCEDURE — 665998 HH PPS REVENUE CREDIT

## 2025-08-13 PROCEDURE — G0152 HHCP-SERV OF OT,EA 15 MIN: HCPCS

## 2025-08-13 ASSESSMENT — ENCOUNTER SYMPTOMS
PAIN SEVERITY GOAL: 0/10
PAIN LOCATION - PAIN FREQUENCY: CONSTANT
SUBJECTIVE PAIN PROGRESSION: UNCHANGED
PAIN LOCATION - EXACERBATING FACTORS: WITH MOVEMENT
HIGHEST PAIN SEVERITY IN PAST 24 HOURS: 5/10
LOWEST PAIN SEVERITY IN PAST 24 HOURS: 0/10
PAIN: 1
PAIN LOCATION - PAIN SEVERITY: 0/10
PAIN LOCATION: CHRONIC BACK PAIN
PAIN LOCATION - PAIN DURATION: INTERMITTENT
PAIN LOCATION - PAIN QUALITY: CHRONIC

## 2025-08-14 ENCOUNTER — HOME CARE VISIT (OUTPATIENT)
Dept: HOME HEALTH SERVICES | Facility: HOME HEALTHCARE | Age: 80
End: 2025-08-14
Payer: MEDICARE

## 2025-08-14 VITALS
DIASTOLIC BLOOD PRESSURE: 82 MMHG | TEMPERATURE: 97.5 F | SYSTOLIC BLOOD PRESSURE: 130 MMHG | HEART RATE: 62 BPM | OXYGEN SATURATION: 97 % | RESPIRATION RATE: 16 BRPM

## 2025-08-14 PROCEDURE — 665999 HH PPS REVENUE DEBIT

## 2025-08-14 PROCEDURE — G0180 MD CERTIFICATION HHA PATIENT: HCPCS | Performed by: FAMILY MEDICINE

## 2025-08-14 PROCEDURE — 665998 HH PPS REVENUE CREDIT

## 2025-08-14 PROCEDURE — G0299 HHS/HOSPICE OF RN EA 15 MIN: HCPCS

## 2025-08-14 ASSESSMENT — ENCOUNTER SYMPTOMS
COUGH CHARACTERISTICS: NON-PRODUCTIVE
PAIN LOCATION: BACK
SHORTNESS OF BREATH: 1
STOOL FREQUENCY: DAILY
PAIN SEVERITY GOAL: 3/10
COUGH CHARACTERISTICS: DRY
LOWEST PAIN SEVERITY IN PAST 24 HOURS: 0/10
PAIN LOCATION - RELIEVING FACTORS: LAYING DOWN
PAIN: 1
PAIN LOCATION - PAIN SEVERITY: 5/10
BOWEL PATTERN NORMAL: 1
VOMITING: PT DENIES ANY EMESIS AT THIS TIME
SUBJECTIVE PAIN PROGRESSION: UNCHANGED
NAUSEA: PT DENIES ANY NAUSEA AT THIS TIME
PAIN LOCATION - EXACERBATING FACTORS: ACTIVITY
PAIN LOCATION - PAIN FREQUENCY: FREQUENT
DYSPNEA ACTIVITY LEVEL: AFTER AMBULATING 10 - 20 FT
HIGHEST PAIN SEVERITY IN PAST 24 HOURS: 5/10
PAIN LOCATION - PAIN QUALITY: ACHY
COUGH: 1
LAST BOWEL MOVEMENT: 67431

## 2025-08-14 ASSESSMENT — ACTIVITIES OF DAILY LIVING (ADL)
CURRENT_FUNCTION: STAND BY ASSIST
AMBULATION ASSISTANCE: STAND BY ASSIST

## 2025-08-15 PROCEDURE — 665999 HH PPS REVENUE DEBIT

## 2025-08-15 PROCEDURE — 665998 HH PPS REVENUE CREDIT

## 2025-08-15 ASSESSMENT — ACTIVITIES OF DAILY LIVING (ADL): OASIS_M1830: 05

## 2025-08-16 PROCEDURE — 665998 HH PPS REVENUE CREDIT

## 2025-08-16 PROCEDURE — 665999 HH PPS REVENUE DEBIT

## 2025-08-17 PROCEDURE — 665998 HH PPS REVENUE CREDIT

## 2025-08-17 PROCEDURE — 665999 HH PPS REVENUE DEBIT

## 2025-08-18 ENCOUNTER — HOME CARE VISIT (OUTPATIENT)
Dept: HOME HEALTH SERVICES | Facility: HOME HEALTHCARE | Age: 80
End: 2025-08-18
Payer: MEDICARE

## 2025-08-18 ENCOUNTER — PATIENT MESSAGE (OUTPATIENT)
Dept: MEDICAL GROUP | Facility: MEDICAL CENTER | Age: 80
End: 2025-08-18
Payer: MEDICARE

## 2025-08-18 VITALS
DIASTOLIC BLOOD PRESSURE: 68 MMHG | HEART RATE: 69 BPM | SYSTOLIC BLOOD PRESSURE: 134 MMHG | OXYGEN SATURATION: 96 % | RESPIRATION RATE: 16 BRPM | TEMPERATURE: 97.8 F

## 2025-08-18 DIAGNOSIS — R60.0 LEG EDEMA, RIGHT: Primary | ICD-10-CM

## 2025-08-18 DIAGNOSIS — M79.89 PAIN AND SWELLING OF LEFT LOWER EXTREMITY: Primary | ICD-10-CM

## 2025-08-18 DIAGNOSIS — M79.605 PAIN AND SWELLING OF LEFT LOWER EXTREMITY: Primary | ICD-10-CM

## 2025-08-18 PROCEDURE — 665998 HH PPS REVENUE CREDIT

## 2025-08-18 PROCEDURE — G0299 HHS/HOSPICE OF RN EA 15 MIN: HCPCS

## 2025-08-18 PROCEDURE — 665999 HH PPS REVENUE DEBIT

## 2025-08-18 PROCEDURE — G0151 HHCP-SERV OF PT,EA 15 MIN: HCPCS

## 2025-08-18 ASSESSMENT — ENCOUNTER SYMPTOMS
BOWEL PATTERN NORMAL: 1
VOMITING: DENIES
DENIES PAIN: 1
LAST BOWEL MOVEMENT: 67435
NAUSEA: DENIES

## 2025-08-18 ASSESSMENT — PATIENT HEALTH QUESTIONNAIRE - PHQ9: CLINICAL INTERPRETATION OF PHQ2 SCORE: 0

## 2025-08-19 ENCOUNTER — RESULTS FOLLOW-UP (OUTPATIENT)
Dept: MEDICAL GROUP | Facility: MEDICAL CENTER | Age: 80
End: 2025-08-19
Payer: MEDICARE

## 2025-08-19 ENCOUNTER — HOSPITAL ENCOUNTER (OUTPATIENT)
Facility: MEDICAL CENTER | Age: 80
End: 2025-08-19
Attending: FAMILY MEDICINE
Payer: MEDICARE

## 2025-08-19 ENCOUNTER — HOSPITAL ENCOUNTER (OUTPATIENT)
Dept: RADIOLOGY | Facility: MEDICAL CENTER | Age: 80
End: 2025-08-19
Attending: FAMILY MEDICINE
Payer: MEDICARE

## 2025-08-19 DIAGNOSIS — M79.89 PAIN AND SWELLING OF LEFT LOWER EXTREMITY: ICD-10-CM

## 2025-08-19 DIAGNOSIS — M79.605 PAIN AND SWELLING OF LEFT LOWER EXTREMITY: ICD-10-CM

## 2025-08-19 DIAGNOSIS — R79.9 ELEVATED BUN: ICD-10-CM

## 2025-08-19 LAB
ALBUMIN SERPL BCP-MCNC: 3.6 G/DL (ref 3.2–4.9)
ALBUMIN/GLOB SERPL: 1.6 G/DL
ALP SERPL-CCNC: 94 U/L (ref 30–99)
ALT SERPL-CCNC: 21 U/L (ref 2–50)
ANION GAP SERPL CALC-SCNC: 12 MMOL/L (ref 7–16)
AST SERPL-CCNC: 20 U/L (ref 12–45)
BILIRUB SERPL-MCNC: 0.5 MG/DL (ref 0.1–1.5)
BUN SERPL-MCNC: 18 MG/DL (ref 8–22)
CALCIUM ALBUM COR SERPL-MCNC: 9.4 MG/DL (ref 8.5–10.5)
CALCIUM SERPL-MCNC: 9.1 MG/DL (ref 8.5–10.5)
CHLORIDE SERPL-SCNC: 104 MMOL/L (ref 96–112)
CO2 SERPL-SCNC: 25 MMOL/L (ref 20–33)
CREAT SERPL-MCNC: 0.65 MG/DL (ref 0.5–1.4)
GFR SERPLBLD CREATININE-BSD FMLA CKD-EPI: 89 ML/MIN/1.73 M 2
GLOBULIN SER CALC-MCNC: 2.2 G/DL (ref 1.9–3.5)
GLUCOSE SERPL-MCNC: 107 MG/DL (ref 65–99)
POTASSIUM SERPL-SCNC: 3.7 MMOL/L (ref 3.6–5.5)
PROT SERPL-MCNC: 5.8 G/DL (ref 6–8.2)
SODIUM SERPL-SCNC: 141 MMOL/L (ref 135–145)

## 2025-08-19 PROCEDURE — 665998 HH PPS REVENUE CREDIT

## 2025-08-19 PROCEDURE — 93971 EXTREMITY STUDY: CPT | Mod: LT

## 2025-08-19 PROCEDURE — 36415 COLL VENOUS BLD VENIPUNCTURE: CPT

## 2025-08-19 PROCEDURE — 80053 COMPREHEN METABOLIC PANEL: CPT

## 2025-08-19 PROCEDURE — 665999 HH PPS REVENUE DEBIT

## 2025-08-19 RX ORDER — FUROSEMIDE 20 MG/1
20 TABLET ORAL
Qty: 30 TABLET | Refills: 1 | Status: SHIPPED | OUTPATIENT
Start: 2025-08-19

## 2025-08-19 ASSESSMENT — ACTIVITIES OF DAILY LIVING (ADL): TRANSPORTATION COMMENTS: REQUIRES ASSISTANCE AND AD TO LEAVE THE HOME

## 2025-08-19 ASSESSMENT — ENCOUNTER SYMPTOMS: DENIES PAIN: 1

## 2025-08-20 ENCOUNTER — HOME CARE VISIT (OUTPATIENT)
Dept: HOME HEALTH SERVICES | Facility: HOME HEALTHCARE | Age: 80
End: 2025-08-20
Payer: MEDICARE

## 2025-08-20 PROCEDURE — 665999 HH PPS REVENUE DEBIT

## 2025-08-20 PROCEDURE — 93971 EXTREMITY STUDY: CPT | Mod: 26,LT | Performed by: INTERNAL MEDICINE

## 2025-08-20 PROCEDURE — 665998 HH PPS REVENUE CREDIT

## 2025-08-21 ENCOUNTER — HOME CARE VISIT (OUTPATIENT)
Dept: HOME HEALTH SERVICES | Facility: HOME HEALTHCARE | Age: 80
End: 2025-08-21
Payer: MEDICARE

## 2025-08-21 VITALS
DIASTOLIC BLOOD PRESSURE: 66 MMHG | RESPIRATION RATE: 16 BRPM | OXYGEN SATURATION: 94 % | TEMPERATURE: 97.8 F | HEART RATE: 67 BPM | SYSTOLIC BLOOD PRESSURE: 128 MMHG

## 2025-08-21 PROCEDURE — 665999 HH PPS REVENUE DEBIT

## 2025-08-21 PROCEDURE — G0299 HHS/HOSPICE OF RN EA 15 MIN: HCPCS

## 2025-08-21 PROCEDURE — G0151 HHCP-SERV OF PT,EA 15 MIN: HCPCS

## 2025-08-21 PROCEDURE — 665998 HH PPS REVENUE CREDIT

## 2025-08-21 ASSESSMENT — ENCOUNTER SYMPTOMS
LAST BOWEL MOVEMENT: 67438
DENIES PAIN: 1
VOMITING: DENIES
NAUSEA: DENIES
BOWEL PATTERN NORMAL: 1

## 2025-08-21 ASSESSMENT — PATIENT HEALTH QUESTIONNAIRE - PHQ9: CLINICAL INTERPRETATION OF PHQ2 SCORE: 0

## 2025-08-22 PROCEDURE — 665999 HH PPS REVENUE DEBIT

## 2025-08-22 PROCEDURE — 665998 HH PPS REVENUE CREDIT

## 2025-08-23 PROCEDURE — 665998 HH PPS REVENUE CREDIT

## 2025-08-23 PROCEDURE — 665999 HH PPS REVENUE DEBIT

## 2025-08-23 ASSESSMENT — ENCOUNTER SYMPTOMS
PAIN LOCATION: BACK
PAIN LOCATION - PAIN QUALITY: ACHE/SHARP
PAIN LOCATION - PAIN SEVERITY: 0/10
PAIN LOCATION - PAIN FREQUENCY: INTERMITTENT
LOWEST PAIN SEVERITY IN PAST 24 HOURS: 0/10
PAIN LOCATION - PAIN DURATION: CHRONIC
PAIN: 1
SUBJECTIVE PAIN PROGRESSION: WAXING AND WANING
HIGHEST PAIN SEVERITY IN PAST 24 HOURS: 3/10
PAIN LOCATION - EXACERBATING FACTORS: INCREASED ACTIVITY
PAIN SEVERITY GOAL: 0/10

## 2025-08-24 PROCEDURE — 665999 HH PPS REVENUE DEBIT

## 2025-08-24 PROCEDURE — 665998 HH PPS REVENUE CREDIT

## 2025-08-25 ENCOUNTER — HOME CARE VISIT (OUTPATIENT)
Dept: HOME HEALTH SERVICES | Facility: HOME HEALTHCARE | Age: 80
End: 2025-08-25
Payer: MEDICARE

## 2025-08-25 PROCEDURE — 665999 HH PPS REVENUE DEBIT

## 2025-08-25 PROCEDURE — G0151 HHCP-SERV OF PT,EA 15 MIN: HCPCS

## 2025-08-25 PROCEDURE — 665998 HH PPS REVENUE CREDIT

## 2025-08-26 ENCOUNTER — APPOINTMENT (OUTPATIENT)
Dept: RADIOLOGY | Facility: MEDICAL CENTER | Age: 80
End: 2025-08-26
Attending: FAMILY MEDICINE
Payer: MEDICARE

## 2025-08-26 ENCOUNTER — HOME CARE VISIT (OUTPATIENT)
Dept: HOME HEALTH SERVICES | Facility: HOME HEALTHCARE | Age: 80
End: 2025-08-26
Payer: MEDICARE

## 2025-08-26 VITALS
RESPIRATION RATE: 16 BRPM | HEART RATE: 71 BPM | TEMPERATURE: 97.6 F | OXYGEN SATURATION: 97 % | DIASTOLIC BLOOD PRESSURE: 68 MMHG | SYSTOLIC BLOOD PRESSURE: 122 MMHG

## 2025-08-26 PROCEDURE — 665998 HH PPS REVENUE CREDIT

## 2025-08-26 PROCEDURE — 665999 HH PPS REVENUE DEBIT

## 2025-08-26 ASSESSMENT — ENCOUNTER SYMPTOMS
PAIN SEVERITY GOAL: 0/10
PAIN LOCATION - PAIN FREQUENCY: CONSTANT
PAIN LOCATION: BACK
PAIN LOCATION - PAIN QUALITY: ACHE/SHARP
HIGHEST PAIN SEVERITY IN PAST 24 HOURS: 5/10
PAIN LOCATION - PAIN DURATION: DAILY
SUBJECTIVE PAIN PROGRESSION: WAXING AND WANING
PAIN LOCATION - EXACERBATING FACTORS: INCREASED ACTIVITY
PAIN LOCATION - PAIN SEVERITY: 4/10
LOWEST PAIN SEVERITY IN PAST 24 HOURS: 0/10
PAIN: 1

## 2025-08-27 ENCOUNTER — HOSPITAL ENCOUNTER (OUTPATIENT)
Dept: RADIOLOGY | Facility: MEDICAL CENTER | Age: 80
End: 2025-08-27
Attending: FAMILY MEDICINE
Payer: MEDICARE

## 2025-08-27 DIAGNOSIS — Z13.820 ENCOUNTER FOR OSTEOPOROSIS SCREENING IN ASYMPTOMATIC POSTMENOPAUSAL PATIENT: ICD-10-CM

## 2025-08-27 DIAGNOSIS — Z78.0 ENCOUNTER FOR OSTEOPOROSIS SCREENING IN ASYMPTOMATIC POSTMENOPAUSAL PATIENT: ICD-10-CM

## 2025-08-27 PROCEDURE — 665998 HH PPS REVENUE CREDIT

## 2025-08-27 PROCEDURE — 77080 DXA BONE DENSITY AXIAL: CPT

## 2025-08-27 PROCEDURE — 665999 HH PPS REVENUE DEBIT

## 2025-08-28 ENCOUNTER — HOME CARE VISIT (OUTPATIENT)
Dept: HOME HEALTH SERVICES | Facility: HOME HEALTHCARE | Age: 80
End: 2025-08-28
Payer: MEDICARE

## 2025-08-28 PROCEDURE — G0151 HHCP-SERV OF PT,EA 15 MIN: HCPCS

## 2025-08-28 PROCEDURE — G0493 RN CARE EA 15 MIN HH/HOSPICE: HCPCS

## 2025-08-28 PROCEDURE — 665998 HH PPS REVENUE CREDIT

## 2025-08-28 PROCEDURE — 665999 HH PPS REVENUE DEBIT

## 2025-08-29 ENCOUNTER — TELEPHONE (OUTPATIENT)
Dept: HOME HEALTH SERVICES | Facility: HOME HEALTHCARE | Age: 80
End: 2025-08-29
Payer: MEDICARE

## 2025-08-29 VITALS
RESPIRATION RATE: 17 BRPM | DIASTOLIC BLOOD PRESSURE: 68 MMHG | OXYGEN SATURATION: 96 % | SYSTOLIC BLOOD PRESSURE: 122 MMHG | TEMPERATURE: 97.7 F | HEART RATE: 72 BPM

## 2025-08-29 PROCEDURE — 665998 HH PPS REVENUE CREDIT

## 2025-08-29 PROCEDURE — 665999 HH PPS REVENUE DEBIT

## 2025-08-29 ASSESSMENT — ENCOUNTER SYMPTOMS
PAIN SEVERITY GOAL: 0/10
HIGHEST PAIN SEVERITY IN PAST 24 HOURS: 7/10
LOWEST PAIN SEVERITY IN PAST 24 HOURS: 4/10
HIGHEST PAIN SEVERITY IN PAST 24 HOURS: 7/10
PAIN LOCATION - PAIN SEVERITY: 7/10
SUBJECTIVE PAIN PROGRESSION: WAXING AND WANING
PAIN LOCATION - EXACERBATING FACTORS: INCREASED ACTIVITY
LOWEST PAIN SEVERITY IN PAST 24 HOURS: 4/10
PAIN: 1
PAIN LOCATION - PAIN DURATION: CHRONIC
PAIN SEVERITY GOAL: 3/10
PAIN LOCATION - EXACERBATING FACTORS: ACTIVITY
PAIN LOCATION - PAIN DURATION: YEARS
PAIN LOCATION - PAIN SEVERITY: 7/10
PAIN LOCATION: BACK
SUBJECTIVE PAIN PROGRESSION: WAXING AND WANING
PAIN LOCATION - PAIN QUALITY: ACHE/SHARP
PAIN LOCATION - PAIN FREQUENCY: CONSTANT
PAIN LOCATION - PAIN FREQUENCY: CONSTANT
LAST BOWEL MOVEMENT: 67444
PAIN LOCATION - PAIN QUALITY: ACHY
PAIN LOCATION: BACK
PAIN: 1

## 2025-08-30 PROCEDURE — 665998 HH PPS REVENUE CREDIT

## 2025-08-30 PROCEDURE — 665999 HH PPS REVENUE DEBIT

## 2025-10-01 ENCOUNTER — APPOINTMENT (OUTPATIENT)
Dept: NEUROLOGY | Facility: MEDICAL CENTER | Age: 80
End: 2025-10-01
Attending: PSYCHIATRY & NEUROLOGY
Payer: MEDICARE